# Patient Record
Sex: FEMALE
[De-identification: names, ages, dates, MRNs, and addresses within clinical notes are randomized per-mention and may not be internally consistent; named-entity substitution may affect disease eponyms.]

---

## 2018-08-30 ENCOUNTER — HOSPITAL ENCOUNTER (EMERGENCY)
Dept: HOSPITAL 97 - ER | Age: 28
Discharge: HOME | End: 2018-08-30
Payer: SELF-PAY

## 2018-08-30 VITALS — DIASTOLIC BLOOD PRESSURE: 58 MMHG | OXYGEN SATURATION: 97 % | SYSTOLIC BLOOD PRESSURE: 93 MMHG

## 2018-08-30 VITALS — TEMPERATURE: 99 F

## 2018-08-30 DIAGNOSIS — N20.0: Primary | ICD-10-CM

## 2018-08-30 DIAGNOSIS — Z87.442: ICD-10-CM

## 2018-08-30 LAB
BLD SMEAR INTERP: (no result)
BUN BLD-MCNC: 8 MG/DL (ref 7–18)
GLUCOSE SERPLBLD-MCNC: 125 MG/DL (ref 74–106)
HCT VFR BLD CALC: 42.7 % (ref 36–45)
LYMPHOCYTES # SPEC AUTO: 0.8 K/UL (ref 0.7–4.9)
MCH RBC QN AUTO: 29.4 PG (ref 27–35)
MCV RBC: 87.4 FL (ref 80–100)
MORPHOLOGY BLD-IMP: (no result)
PMV BLD: 8 FL (ref 7.6–11.3)
POTASSIUM SERPL-SCNC: 3.4 MMOL/L (ref 3.5–5.1)
RBC # BLD: 4.89 M/UL (ref 3.86–4.86)

## 2018-08-30 PROCEDURE — 81025 URINE PREGNANCY TEST: CPT

## 2018-08-30 PROCEDURE — 81003 URINALYSIS AUTO W/O SCOPE: CPT

## 2018-08-30 PROCEDURE — 85025 COMPLETE CBC W/AUTO DIFF WBC: CPT

## 2018-08-30 PROCEDURE — 99284 EMERGENCY DEPT VISIT MOD MDM: CPT

## 2018-08-30 PROCEDURE — 36415 COLL VENOUS BLD VENIPUNCTURE: CPT

## 2018-08-30 PROCEDURE — 96361 HYDRATE IV INFUSION ADD-ON: CPT

## 2018-08-30 PROCEDURE — 76377 3D RENDER W/INTRP POSTPROCES: CPT

## 2018-08-30 PROCEDURE — 96374 THER/PROPH/DIAG INJ IV PUSH: CPT

## 2018-08-30 PROCEDURE — 80048 BASIC METABOLIC PNL TOTAL CA: CPT

## 2018-08-30 PROCEDURE — 74176 CT ABD & PELVIS W/O CONTRAST: CPT

## 2018-08-30 NOTE — RAD REPORT
EXAM DESCRIPTION:  CT - Stone Protocol - 8/30/2018 9:25 am

 

CLINICAL HISTORY:  Abdominal pain/left lower quadrant pain for 3 days

 

COMPARISON:  None

 

TECHNIQUE:  Computed axial tomography of the abdomen pelvis was obtained without oral or IV contrast.
 Lack of IV and oral contrast limits evaluation of solid organs, bowel, and vessels. Coronal reformat
eamon images were obtained and reviewed.

 

All CT scans are performed using dose optimization technique as appropriate and may include automated
 exposure control or mA/KV adjustment according to patient size.

 

FINDINGS:  Multiple, small bilateral renal calculi are present. Hydronephrosis is not seen. A 1 emma
meter calculus is suspected within the mid left ureter.

 

The liver, spleen, pancreas and adrenals appear grossly normal

 

There is no evidence of diverticulitis. The appendix appears normal

 

Small nodular densities are within posterior for of within the ischial rectal fossa which likely is b
enign.

 

IMPRESSION:  1 millimeter nonobstructing left ureteral calculus.

 

Small bilateral renal calculi

## 2018-08-30 NOTE — ER
Nurse's Notes                                                                                     

 [facility]                                                                

Name: Krystal Lozano                                                                             

Age: 28 yrs                                                                                       

Sex: Female                                                                                       

: 1990                                                                                   

MRN: [id number]                                                                                   

Arrival Date: 2018                                                                          

Time: 08:39                                                                                       

Account#: [id number]                                                                            

Bed 5                                                                                             

Private MD: Sp Tovar M                                                                       

Diagnosis: Calculus of kidney                                                                     

                                                                                                  

Presentation:                                                                                     

                                                                                             

08:49 Presenting complaint: Patient states: LLQ pain x 3 days that resolved yesterday, left   hb  

      flank pain today. TMAX 102. Transition of care: patient was not received from another       

      setting of care. Onset of symptoms was 2018. Risk Assessment: Do you want to     

      hurt yourself or someone else? Patient reports no desire to harm self or others. Care       

      prior to arrival: None.                                                                     

08:49 Method Of Arrival: Ambulatory                                                           hb  

08:49 Acuity: OMAR 3                                                                           hb  

09:00 Initial Sepsis Screen: Does the patient meet any 2 criteria? No. Patient's initial      hb  

      sepsis screen is negative. Does the patient have a suspected source of infection? No.       

      Patient's initial sepsis screen is negative.                                                

11:15 Initial Sepsis Screen:.                                                                 cc3 

                                                                                                  

Historical:                                                                                       

- Allergies:                                                                                      

08:52 No Known Allergies;                                                                     hb  

- Home Meds:                                                                                      

08:52 fluoxetine 20 mg Oral cap [Active];                                                     hb  

- PMHx:                                                                                           

08:52 None;                                                                                   hb  

- PSHx:                                                                                           

08:52 None;                                                                                   hb  

                                                                                                  

- Immunization history:: Adult Immunizations up to date.                                          

- Social history:: Smoking status: Patient/guardian denies using tobacco.                         

- Ebola Screening: : No symptoms or risks identified at this time.                                

                                                                                                  

                                                                                                  

Screenin:52 Abuse screen: Denies threats or abuse. Denies injuries from another. Nutritional        hb  

      screening: No deficits noted. Tuberculosis screening: No symptoms or risk factors           

      identified. Fall Risk None identified.                                                      

                                                                                                  

Assessment:                                                                                       

08:52 General: Appears in no apparent distress. Behavior is cooperative, anxious, crying.     hb  

      Pain: Pain currently is 4 out of 10 on a pain scale. Neuro: Level of Consciousness is       

      awake, alert, obeys commands, Oriented to person, place, time, situation.                   

      Cardiovascular: Capillary refill < 3 seconds Patient's skin is warm and dry.                

      Respiratory: Airway is patent Trachea midline Respiratory effort is even, unlabored,        

      Respiratory pattern is regular, symmetrical, Breath sounds are clear bilaterally. GI:       

      Abdomen is flat, Bowel sounds present X 4 quads. Abd is soft and non tender X 4 quads.      

      : Reports urinary frequency. EENT: No signs and/or symptoms were reported regarding       

      the EENT system. Derm: Skin is intact, is healthy with good turgor. Musculoskeletal: No     

      signs and/or symptoms reported regarding the musculoskeletal system.                        

09:45 Reassessment: Patient appears in no apparent distress at this time. Patient and/or      hb  

      family updated on plan of care and expected duration. Pain level reassessed. Patient is     

      alert, oriented x 3, equal unlabored respirations, skin warm/dry/pink.                      

10:45 Reassessment: Patient appears in no apparent distress at this time. Patient and/or      hb  

      family updated on plan of care and expected duration. Pain level reassessed. Patient is     

      alert, oriented x 3, equal unlabored respirations, skin warm/dry/pink.                      

                                                                                                  

Vital Signs:                                                                                      

08:49  / 90; Pulse 102; Resp 16; Temp 99; Pulse Ox 100% on R/A; Pain 4/10;              hb  

10:31 BP 93 / 58; Pulse 81; Resp 16; Pulse Ox 97% ;                                           sv  

                                                                                                  

ED Course:                                                                                        

08:39 Patient arrived in ED.                                                                  sb2 

08:40 Sp Tovar MD is Private Physician.                                                  sb2 

08:42 [facility]                                                        kb  

08:42 Blake Squires MD is Attending Physician.                                                kb  

08:50 Triage completed.                                                                       hb  

08:52 Arm band placed on right wrist.                                                         hb  

08:52 Patient has correct armband on for positive identification. Placed in gown. Bed in low  hb  

      position. Call light in reach. Side rails up X 1.                                           

09:09 Rosalva Ortega, RN is Primary Nurse.                                                   hb  

09:20 CT completed. Patient tolerated procedure well. Patient moved to CT via wheelchair.     [id number] 

09:25 CT Stone Protocol In Process Unspecified.                                               EDMS

11:10 No provider procedures requiring assistance completed. IV discontinued, intact,         cc3 

      bleeding controlled, No redness/swelling at site. Pressure dressing applied.                

                                                                                                  

Administered Medications:                                                                         

09:00 Drug: NS 0.9% 1000 ml Route: IV; Rate: 1000 ml; Site: right antecubital;                hb  

11:15 Follow up: Response: No adverse reaction; IV Status: Completed infusion                 hb  

09:03 Drug: TORadol 30 mg Route: IVP; Site: right antecubital;                                sv  

09:45 Follow up: Response: No adverse reaction; Pain is decreased                             hb  

11:15 Drug: Potassium Chloride 20 mEq Route: PO;                                              hb  

11:15 Follow up: Response: Medication administered at discharge.                              hb  

11:15 Drug: Norco 5 mg-325 mg 1 tabs Route: PO;                                               hb  

11:15 Follow up: Response: Medication administered at discharge.                              hb  

                                                                                                  

                                                                                                  

Outcome:                                                                                          

10:55 Discharge ordered by MD.                                                                kb  

11:10 Discharged to home ambulatory.                                                          cc3 

11:10 Condition: stable                                                                           

11:10 Discharge instructions given to patient, Instructed on discharge instructions, follow       

      up and referral plans. medication usage, Demonstrated understanding of instructions,        

      follow-up care, medications, Prescriptions given X 3.                                       

11:16 Patient left the ED.                                                                    hb  

                                                                                                  

Signatures:                                                                                       

Dispatcher [employer]                           EDMS                                                 

Awa Arriaza, SANIA MARTINEZ-Irma Gonzalez, RN                    RN   Rosalva Gimenez RN                     RN                                                      

Daniela Robertson2                                                  

Lakshmi Can3                                                  

Velma Cain                              [email or web address]                                                  

                                                                                                  

**************************************************************************************************

## 2018-08-30 NOTE — EDPHYS
Physician Documentation                                                                           

 [facility]                                                                

Name: Krystal Lozano                                                                             

Age: 28 yrs                                                                                       

Sex: Female                                                                                       

: 1990                                                                                   

MRN: [id number]                                                                                   

Arrival Date: 2018                                                                          

Time: 08:39                                                                                       

Account#: [id number]                                                                            

Bed 5                                                                                             

Private MD: Sp Tovar M                                                                       

ED Physician Blake Squires                                                                         

HPI:                                                                                              

                                                                                             

08:55 This 28 yrs old  Female presents to ER via Ambulatory with complaints of        kb  

      Possible Kidney Stone.                                                                      

08:55 The patient complains of pain in the left flank. The pain does not radiate. Onset: The  kb  

      symptoms/episode began/occurred 2 day(s) ago. Modifying factors: The symptoms are           

      alleviated by nothing. the symptoms are aggravated by palpation/percussion. Associated      

      signs and symptoms: Pertinent positives: nausea, vomiting. Severity of pain: At its         

      worst the pain was moderate in the emergency department the pain is unchanged. The          

      patient has experienced a previous episode, but today's symptoms are worse. The patient     

      has been recently seen by a physician:. Pt states abd cramping started 2 days ago and       

      now the pain is in left flank. States she has had a kidney stone in the past and it         

      feels the same. Reports she has been vomiting once every morning for a month. Went to       

      pcp 2 days ago and was told to go to GYN. Saw Dr Espitia and was told to come to the ER       

      for continued symptoms.                                                                     

                                                                                                  

Historical:                                                                                       

- Allergies:                                                                                      

08:52 No Known Allergies;                                                                     hb  

- Home Meds:                                                                                      

08:52 fluoxetine 20 mg Oral cap [Active];                                                     hb  

- PMHx:                                                                                           

08:52 None;                                                                                   hb  

- PSHx:                                                                                           

08:52 None;                                                                                   hb  

                                                                                                  

- Immunization history:: Adult Immunizations up to date.                                          

- Social history:: Smoking status: Patient/guardian denies using tobacco.                         

- Ebola Screening: : No symptoms or risks identified at this time.                                

                                                                                                  

                                                                                                  

ROS:                                                                                              

08:55 Constitutional: Negative for fever, chills, and weight loss, Cardiovascular: Negative   kb  

      for chest pain, palpitations, and edema, Respiratory: Negative for shortness of breath,     

      cough, wheezing, and pleuritic chest pain, MS/Extremity: Negative for injury and            

      deformity, Skin: Negative for injury, rash, and discoloration, Neuro: Negative for          

      headache, weakness, numbness, tingling, and seizure.                                        

08:55 Abdomen/GI: Positive for nausea and vomiting, Negative for abdominal pain, diarrhea,        

      constipation, abdominal cramps, abdominal distension, anorexia.                             

08:55 Back: Positive for flank pain, on the left.                                                 

                                                                                                  

Exam:                                                                                             

08:55 Constitutional:  This is a well developed, well nourished patient who is awake, alert,  kb  

      and in no acute distress. Head/Face:  Normocephalic, atraumatic. Chest/axilla:  Normal      

      chest wall appearance and motion.  Nontender with no deformity.  No lesions are             

      appreciated. Cardiovascular:  Regular rate and rhythm with a normal S1 and S2.  No          

      gallops, murmurs, or rubs.  Normal PMI, no JVD.  No pulse deficits. Respiratory:  Lungs     

      have equal breath sounds bilaterally, clear to auscultation and percussion.  No rales,      

      rhonchi or wheezes noted.  No increased work of breathing, no retractions or nasal          

      flaring. Abdomen/GI:  Soft, non-tender, with normal bowel sounds.  No distension or         

      tympany.  No guarding or rebound.  No evidence of tenderness throughout. Skin:  Warm,       

      dry with normal turgor.  Normal color with no rashes, no lesions, and no evidence of        

      cellulitis. MS/ Extremity:  Pulses equal, no cyanosis.  Neurovascular intact.  Full,        

      normal range of motion. Neuro:  Awake and alert, GCS 15, oriented to person, place,         

      time, and situation.  Cranial nerves II-XII grossly intact.  Motor strength 5/5 in all      

      extremities.  Sensory grossly intact.  Cerebellar exam normal.  Normal gait.                

08:55 Back: pain, that is moderate, of the  left flank, CVA tenderness, that is moderate, is      

      noted on the left.                                                                          

                                                                                                  

Vital Signs:                                                                                      

08:49  / 90; Pulse 102; Resp 16; Temp 99; Pulse Ox 100% on R/A; Pain 4/10;              hb  

10:31 BP 93 / 58; Pulse 81; Resp 16; Pulse Ox 97% ;                                           sv  

                                                                                                  

MDM:                                                                                              

08:42 Patient medically screened.                                                             kb  

09:00 Data reviewed: vital signs, nurses notes. Data interpreted: Pulse oximetry: on room air kb  

      is 100 %. Interpretation: normal.                                                           

10:50 Counseling: I had a detailed discussion with the patient and/or guardian regarding: the kb  

      historical points, exam findings, and any diagnostic results supporting the                 

      discharge/admit diagnosis, lab results, radiology results, the need for outpatient          

      follow up, a family practitioner, to return to the emergency department if symptoms         

      worsen or persist or if there are any questions or concerns that arise at home.             

                                                                                                  

                                                                                             

08:50 Order name: Basic Metabolic Panel; Complete Time: 09:35                                 kb  

                                                                                             

08:50 Order name: CBC with Diff; Complete Time: 10:57                                         kb  

                                                                                             

08:55 Order name: CT Stone Protocol; Complete Time: 10:50                                     kb  

                                                                                             

09:01 Order name: Urine Dipstick--Ancillary (enter results); Complete Time: 09:40             eb  

                                                                                             

09:01 Order name: Urine Pregnancy--Ancillary (enter results); Complete Time: 09:40            eb  

                                                                                             

09:21 Order name: CBC Smear Scan; Complete Time: 10:57                                        EDMS

                                                                                             

08:50 Order name: Urine Pregnancy Test (obtain specimen); Complete Time: 08:54                kb  

                                                                                             

08:50 Order name: IV Saline Lock; Complete Time: 09:10                                        kb  

                                                                                             

08:50 Order name: Labs collected and sent; Complete Time: 09:10                               kb  

                                                                                             

08:50 Order name: Urine Dipstick-Ancillary (obtain specimen); Complete Time: 08:54            kb  

                                                                                                  

Administered Medications:                                                                         

09:00 Drug: NS 0.9% 1000 ml Route: IV; Rate: 1000 ml; Site: right antecubital;                hb  

11:15 Follow up: Response: No adverse reaction; IV Status: Completed infusion                 hb  

09:03 Drug: TORadol 30 mg Route: IVP; Site: right antecubital;                                sv  

09:45 Follow up: Response: No adverse reaction; Pain is decreased                             hb  

11:15 Drug: Potassium Chloride 20 mEq Route: PO;                                              hb  

11:15 Follow up: Response: Medication administered at discharge.                              hb  

11:15 Drug: Norco 5 mg-325 mg 1 tabs Route: PO;                                               hb  

11:15 Follow up: Response: Medication administered at discharge.                              hb  

                                                                                                  

                                                                                                  

Disposition:                                                                                      

16:47 Co-signature as Attending Physician, Blake Squires MD.                                    rn  

                                                                                                  

Disposition:                                                                                      

18 10:55 Discharged to Home. Impression: Calculus of kidney.                                

- Condition is Stable.                                                                            

- Discharge Instructions: Kidney Stones, Easy-to-Read.                                            

- Prescriptions for Zofran 4 mg Oral Tablet - take 1 tablet by ORAL route every 6 hours           

  As needed; 20 tablet. Diclofenac Sodium 75 mg Oral Tablet, Delayed Release (E.C.) -             

  take 1 tablet by ORAL route 2 times per day As needed; 30 tablet. Macrobid 100 mg               

  Oral Capsule - take 1 capsule by ORAL route every 12 hours for 7 days; 14 capsule.              

- Medication Reconciliation Form, Thank You Letter, Antibiotic Education, Prescription            

  Opioid Use form.                                                                                

- Follow up: Emergency Department; When: As needed; Reason: Worsening of condition.               

  Follow up: Private Physician; When: 2 - 3 days; Reason: Recheck today's complaints,             

  Continuance of care, Re-evaluation by your physician.                                           

                                                                                                  

                                                                                                  

                                                                                                  

Signatures:                                                                                       

Dispatcher [employer]                           EDMS                                                 

Awa Arriaza, FNP-C                 FNP-Irma Gonzalez, RN                    RN   Blake Moe MD MD rn Baxter, Heather, RN                     RN   hb                                                   

                                                                                                  

Corrections: (The following items were deleted from the chart)                                    

11:16 10:55 2018 10:55 Discharged to Home. Impression: Calculus of kidney. Condition is hb  

      Stable. Forms are Medication Reconciliation Form, Thank You Letter, Antibiotic              

      Education, Prescription Opioid Use. Follow up: Emergency Department; When: As needed;       

      Reason: Worsening of condition. Follow up: Private Physician; When: 2 - 3 days; Reason:     

      Recheck today's complaints, Continuance of care, Re-evaluation by your physician. kb        

                                                                                                  

**************************************************************************************************

## 2021-06-14 ENCOUNTER — HOSPITAL ENCOUNTER (EMERGENCY)
Dept: HOSPITAL 97 - ER | Age: 31
Discharge: HOME | End: 2021-06-14
Payer: SELF-PAY

## 2021-06-14 VITALS — OXYGEN SATURATION: 100 % | TEMPERATURE: 97.7 F

## 2021-06-14 VITALS — DIASTOLIC BLOOD PRESSURE: 89 MMHG | SYSTOLIC BLOOD PRESSURE: 123 MMHG

## 2021-06-14 DIAGNOSIS — N13.2: Primary | ICD-10-CM

## 2021-06-14 DIAGNOSIS — Z87.442: ICD-10-CM

## 2021-06-14 LAB
ALBUMIN SERPL BCP-MCNC: 4.1 G/DL (ref 3.4–5)
ALP SERPL-CCNC: 69 U/L (ref 45–117)
ALT SERPL W P-5'-P-CCNC: 37 U/L (ref 12–78)
AST SERPL W P-5'-P-CCNC: 25 U/L (ref 15–37)
BUN BLD-MCNC: 16 MG/DL (ref 7–18)
GLUCOSE SERPLBLD-MCNC: 129 MG/DL (ref 74–106)
HCT VFR BLD CALC: 41.4 % (ref 36–45)
LIPASE SERPL-CCNC: 116 U/L (ref 73–393)
LYMPHOCYTES # SPEC AUTO: 3.9 K/UL (ref 0.7–4.9)
PMV BLD: 8.2 FL (ref 7.6–11.3)
POTASSIUM SERPL-SCNC: 3.9 MMOL/L (ref 3.5–5.1)
RBC # BLD: 4.64 M/UL (ref 3.86–4.86)
SP GR UR: 1.02 (ref 1–1.03)

## 2021-06-14 PROCEDURE — 99284 EMERGENCY DEPT VISIT MOD MDM: CPT

## 2021-06-14 PROCEDURE — 80048 BASIC METABOLIC PNL TOTAL CA: CPT

## 2021-06-14 PROCEDURE — 96375 TX/PRO/DX INJ NEW DRUG ADDON: CPT

## 2021-06-14 PROCEDURE — 81025 URINE PREGNANCY TEST: CPT

## 2021-06-14 PROCEDURE — 76377 3D RENDER W/INTRP POSTPROCES: CPT

## 2021-06-14 PROCEDURE — 83690 ASSAY OF LIPASE: CPT

## 2021-06-14 PROCEDURE — 80076 HEPATIC FUNCTION PANEL: CPT

## 2021-06-14 PROCEDURE — 36415 COLL VENOUS BLD VENIPUNCTURE: CPT

## 2021-06-14 PROCEDURE — 96374 THER/PROPH/DIAG INJ IV PUSH: CPT

## 2021-06-14 PROCEDURE — 85025 COMPLETE CBC W/AUTO DIFF WBC: CPT

## 2021-06-14 PROCEDURE — 74176 CT ABD & PELVIS W/O CONTRAST: CPT

## 2021-06-14 PROCEDURE — 81003 URINALYSIS AUTO W/O SCOPE: CPT

## 2021-06-14 PROCEDURE — 87086 URINE CULTURE/COLONY COUNT: CPT

## 2021-06-14 PROCEDURE — 87088 URINE BACTERIA CULTURE: CPT

## 2021-06-14 NOTE — ER
Nurse's Notes                                                                                     

 [facility]                                                                 

Name: Krystal Lozano                                                                             

Age: 31 yrs                                                                                       

Sex: Female                                                                                       

: 1990                                                                                   

MRN: [id number]                                                                                   

Arrival Date: 2021                                                                          

Time: 05:36                                                                                       

Account#: [id number]                                                                            

Bed 20                                                                                            

Private MD:                                                                                       

Diagnosis: Hydronephrosis with renal and ureteral calculous obstruction                           

                                                                                                  

Presentation:                                                                                     

                                                                                             

05:45 Chief complaint: Patient states: she is having severe back pain which radiates to her   bb  

      abdomen. Coronavirus screen: At this time, the client does not indicate any symptoms        

      associated with coronavirus-19. Ebola Screen: No symptoms or risks identified at this       

      time.                                                                                       

05:45 Method Of Arrival: Ambulatory                                                           bb  

05:50 Initial Sepsis Screen: Does the patient meet any 2 criteria? No. Patient's initial      bb  

      sepsis screen is negative. Does the patient have a suspected source of infection? No.       

      Patient's initial sepsis screen is negative. Risk Assessment: Do you want to hurt           

      yourself or someone else? Patient reports no desire to harm self or others. Onset of        

      symptoms was 2021.                                                                 

05:50 Acuity: OMAR 3                                                                           bb  

                                                                                                  

OB/GYN:                                                                                           

05:59 LMP N/A - Birth control method                                                          bb  

                                                                                                  

Historical:                                                                                       

- Allergies:                                                                                      

05:59 No Known Allergies;                                                                     bb  

- Home Meds:                                                                                      

05:59 None [Active];                                                                          bb  

- PMHx:                                                                                           

05:59 Kidney stones;                                                                          bb  

- PSHx:                                                                                           

05:59 cerclage; nephrostomy tube;                                                             bb  

                                                                                                  

- Immunization history:: Adult Immunizations unknown.                                             

- Social history:: Smoking status: unknown.                                                       

                                                                                                  

                                                                                                  

Screenin:06 Abuse screen: Denies threats or abuse. Nutritional screening: No deficits noted.        fu  

      Tuberculosis screening: No symptoms or risk factors identified. Fall Risk Ambulatory        

      Aid- None/Bed Rest/Nurse Assist (0 pts). Gait- Normal/Bed Rest/Wheelchair (0 pts)           

      Mental Status- Oriented to own ability (0 pts). Total Baer Fall Scale indicates No         

      Risk (0-24 pts).                                                                            

                                                                                                  

Assessment:                                                                                       

05:59 General: Appears uncomfortable, Behavior is cooperative, anxious. Pain: Complains of    fu  

      pain in backpain Pain radiates to abdomen Pain currently is 10 out of 10 on a pain          

      scale. Pain began 1 day ago. Neuro: Level of Consciousness is awake, alert, obeys           

      commands, Oriented to person, place, time, situation,  are equal bilaterally Moves     

      all extremities. Gait is unsteady, Speech is normal, Facial symmetry appears normal.        

      Cardiovascular: Denies chest pain.                                                          

07:05 Reassessment: Patient appears in no apparent distress at this time. Patient and/or      fu  

      family updated on plan of care and expected duration. Pain level reassessed. Patient is     

      alert, oriented x 3, equal unlabored respirations, skin warm/dry/pink. Patient states       

      feeling better. Patient states symptoms have improved. Pain: Complains of pain in back      

      Pain radiates to abdomen. Neuro: Level of Consciousness is awake, alert, obeys              

      commands, Oriented to person, place, time, situation. Cardiovascular: Denies chest          

      pain, Capillary refill Patient's skin is warm and dry. Respiratory: Airway is patent        

      Respiratory effort is even, unlabored, Respiratory pattern is regular, symmetrical. GI:     

      Abdomen is non-distended, Abd is soft and non tender X 4 quads.                             

08:07 Reassessment: Patient appears in no apparent distress at this time. Patient and/or      jd3 

      family updated on plan of care and expected duration. Pain level reassessed. Patient is     

      alert, oriented x 3, equal unlabored respirations, skin warm/dry/pink. Patient states       

      feeling better.                                                                             

09:09 Reassessment: Patient appears in no apparent distress at this time. Patient and/or      jd3 

      family updated on plan of care and expected duration. Pain level reassessed. Patient is     

      alert, oriented x 3, equal unlabored respirations, skin warm/dry/pink. pt reporting         

      pain returned. medicated for pain and discharge pending IV fluid infusion.                  

                                                                                                  

Vital Signs:                                                                                      

05:50  / 82; Pulse 66; Resp 20 S; Temp 97.7(O); Pulse Ox 100% on R/A; Weight 54.43 kg   bb  

      (R); Height 5 ft. 2 in. (157.48 cm) (R); Pain 10/10;                                        

07:04  / 84; Pulse 53; Resp 19 S; Pulse Ox 100% on R/A;                                 fu  

08:08  / 93; Pulse 57; Resp 16 S; Pulse Ox 100% on R/A;                                 jd3 

09:09  / 89; Pulse 62; Resp 17 S; Pulse Ox 100% on R/A;                                 jd3 

05:50 Body Mass Index 21.95 (54.43 kg, 157.48 cm)                                             bb  

                                                                                                  

ED Course:                                                                                        

05:36 Patient arrived in ED.                                                                  es  

05:42 Bubba Deng, RN is Primary Nurse.                                                    fu  

05:42 Abiel Jones MD is Attending Physician.                                             shai 

05:50 Arm band placed on Patient placed in an exam room, on a stretcher, on pulse oximetry.   bb  

      Family accompanied patient.                                                                 

05:57 Triage completed.                                                                       bb  

06:30 Hepatic Function Sent.                                                                  fu  

06:30 CBC with Diff Sent.                                                                     fu  

06:30 Basic Metabolic Panel Sent.                                                             fu  

07:06 Patient has correct armband on for positive identification. Bed in low position. Call   fu  

      light in reach. Side rails up X 1. Adult w/ patient. Pulse ox on. NIBP on.                  

08:07 Radiology exam delayed due to pregnancy test not completed at this time.                  

08:27 CT Stone Protocol In Process Unspecified.                                               EDMS

09:04 Brayden Massey MD is Referral Physician.                                              shai 

09:48 No provider procedures requiring assistance completed. IV discontinued, intact,         iw  

      bleeding controlled, No redness/swelling at site. Pressure dressing applied.                

                                                                                                  

Administered Medications:                                                                         

06:09 Drug: morphine 4 mg Route: IVP; Site: right antecubital;                                fu  

06:40 Follow up: Response: Pain is decreased                                                  fu  

06:09 Drug: Zofran (Ondansetron) 4 mg Route: IVP; Site: right antecubital;                    fu  

06:40 Follow up: Response: No adverse reaction                                                fu  

06:15 Drug: NS 0.9% 1000 ml Route: IV; Rate: 1 bolus; Site: right antecubital;                fu  

06:16 Drug: TORadol (ketorolac) 30 mg Route: IVP; Site: right antecubital;                    fu  

06:40 Follow up: Response: Pain is decreased                                                  fu  

08:56 Drug: NS 0.9% 1000 ml Route: IV; Rate: 1 bolus; Site: right antecubital;                jd3 

08:56 Drug: Rocephin (cefTRIAXone) 1 grams Route: IV; Rate: per protocol; Site: right         jd3 

      antecubital;                                                                                

08:56 Drug: Flomax (tamsulosin) 0.4 mg Route: PO;                                             jd3 

09:07 Drug: Dilaudid (HYDROmorphone) 1 mg Route: IVP; Site: right antecubital;                jd3 

09:07 Drug: Zofran (Ondansetron) 4 mg Route: IVP; Site: right antecubital;                    jd3 

                                                                                                  

                                                                                                  

Outcome:                                                                                          

09:04 Discharge ordered by MD.                                                                shai 

09:48 Discharged to home ambulatory, with family.                                             iw  

09:48 Condition: good                                                                             

09:48 Discharge instructions given to patient, Instructed on discharge instructions, follow       

      up and referral plans. medication usage, Demonstrated understanding of instructions,        

      follow-up care, medications, Prescriptions given X 4.                                       

09:49 Patient left the ED.                                                                    iw  

                                                                                                  

Signatures:                                                                                       

Dispatcher [employer]                           EDMS                                                 

Abiel Jones MD MD cha Salyer, Leanna Garner Brenda, RN                     RN   bb                                                   

Kamini Estrada RN RN                                                      

Yoan Suarez RN RN jd3 Umadhay, Felix, RN                      RN   fu                                                   

                                                                                                  

Corrections: (The following items were deleted from the chart)                                    

07:05 07:04  / 84; Pulse 53bpm; Resp 19bpm; Spontaneous; Pulse Ox 100% RA; fu           fu  

07:14 07:05 Reassessment: Patient appears in no apparent distress at this time. Patient       jd3 

      and/or family updated on plan of care and expected duration. Pain level reassessed.         

      Patient is alert, oriented x 3, equal unlabored respirations, skin warm/dry/pink.           

      Patient states feeling better. fu                                                           

07:15 07:05 Pain: Complains of pain in back Pain radiates to abdomen fu                       jd3 

08:07 07:05 Reassessment: Patient appears in no apparent distress at this time. Patient       jd3 

      and/or family updated on plan of care and expected duration. Pain level reassessed.         

      Patient is alert, oriented x 3, equal unlabored respirations, skin warm/dry/pink.           

      Patient states feeling better. jd3                                                          

                                                                                                  

**************************************************************************************************

## 2021-06-14 NOTE — RAD REPORT
EXAM DESCRIPTION:  CT - Stone Protocol - 6/14/2021 8:27 am

 

CLINICAL HISTORY:  Flank pain.

Abd pain;Flank pain

 

COMPARISON:  Stone Protocol dated 8/30/2018

 

TECHNIQUE:  Axial images were obtained without oral or IV contrast. Lack of contrast limits solid org
an and vascular assessment. The field-of-view spans the entirety of the  system partially obscuring
 uppermost abdomen and lung bases. Coronal reformatted images were obtained and reviewed.

 

All CT scans are performed using dose optimization technique as appropriate and may include automated
 exposure control or mA/KV adjustment according to patient size.

 

FINDINGS:  The lower lung fields are clear.

 

Imaged portions of the liver and spleen show no suspicious findings on non-contrast imaging. The panc
reas and adrenal glands are normal. No pathologic lymphadenopathy in the abdomen or pelvis.

 

3 mm calculus suspected distal right ureter with moderate right hydronephrosis and hydroureter presen
t. Additional small caliceal stones are present bilaterally.

 

No bowel obstruction, free air, free fluid or abscess. Normal appendix noted.

 

No significant bony abnormality. Extensive subcutaneous nodules have developed in both gluteal region
 as well as inferior pelvic fat extending to the perineal region. This is of unclear etiology.

 

IMPRESSION:  3 mm calculus distal right ureter with moderate right hydronephrosis and hydroureter.

 

Development of extensive small soft tissue nodules since 2018 study as detailed. This finding is of u
nclear etiology and clinical correlation is needed.

## 2021-06-14 NOTE — EDPHYS
Physician Documentation                                                                           

 [facility]                                                                 

Name: Krystal Lozano                                                                             

Age: 31 yrs                                                                                       

Sex: Female                                                                                       

: 1990                                                                                   

MRN: [id number]                                                                                   

Arrival Date: 2021                                                                          

Time: 05:36                                                                                       

Account#: [id number]                                                                            

Bed 20                                                                                            

Private MD:                                                                                       

ED Physician Abiel Jones                                                                      

HPI:                                                                                              

                                                                                             

05:59 This 31 yrs old  Female presents to ER via Ambulatory with complaints of Hands  shai 

      tingling,back pain, abd pain, unable to move hands.                                         

05:59 The patient complains of pain in the left low back and left mid back. The patient       shai 

      complains of pain in the right mid back and right low back. The pain does not radiate.      

      Onset: The symptoms/episode began/occurred just prior to arrival. Modifying factors:        

      The symptoms are alleviated by nothing. the symptoms are aggravated by nothing.             

      Associated signs and symptoms: The patient has no apparent associated signs or              

      symptoms. Severity of pain: At its worst the pain was moderate severe in the emergency      

      department the pain is unchanged. The patient has not experienced similar symptoms in       

      the past.                                                                                   

                                                                                                  

OB/GYN:                                                                                           

05:59 LMP N/A - Birth control method                                                          bb  

                                                                                                  

Historical:                                                                                       

- Allergies:                                                                                      

05:59 No Known Allergies;                                                                     bb  

- Home Meds:                                                                                      

05:59 None [Active];                                                                          bb  

- PMHx:                                                                                           

05:59 Kidney stones;                                                                          bb  

- PSHx:                                                                                           

05:59 cerclage; nephrostomy tube;                                                             bb  

                                                                                                  

- Immunization history:: Adult Immunizations unknown.                                             

- Social history:: Smoking status: unknown.                                                       

                                                                                                  

                                                                                                  

ROS:                                                                                              

06:00 Constitutional: Negative for fever, chills, and weight loss, Eyes: Negative for injury, shai 

      pain, redness, and discharge, ENT: Negative for injury, pain, and discharge, Neck:          

      Negative for injury, pain, and swelling, Cardiovascular: Negative for chest pain,           

      palpitations, and edema, Respiratory: Negative for shortness of breath, cough,              

      wheezing, and pleuritic chest pain, : Negative for injury, bleeding, discharge, and       

      swelling, MS/Extremity: Negative for injury and deformity, Skin: Negative for injury,       

      rash, and discoloration, Neuro: Negative for headache, weakness, numbness, tingling,        

      and seizure, Psych: Negative for depression, anxiety, suicide ideation, homicidal           

      ideation, and hallucinations, Allergy/Immunology: Negative for hives, rash, and             

      allergies, Endocrine: Negative for neck swelling, polydipsia, polyuria, polyphagia, and     

      marked weight changes.                                                                      

06:00 Abdomen/GI: Positive for abdominal pain, nausea, vomiting, of the posterior aspect of       

      right lateral abdomen, anterior aspect of right lateral abdomen, right upper quadrant       

      and right lower quadrant.                                                                   

                                                                                                  

Exam:                                                                                             

06:00 Constitutional:  This is a well developed, well nourished patient who is awake, alert,  shai 

      and in no acute distress. Head/Face:  Normocephalic, atraumatic. Eyes:  Pupils equal        

      round and reactive to light, extra-ocular motions intact.  Lids and lashes normal.          

      Conjunctiva and sclera are non-icteric and not injected.  Cornea within normal limits.      

      Periorbital areas with no swelling, redness, or edema. ENT:  Nares patent. No nasal         

      discharge, no septal abnormalities noted.  Tympanic membranes are normal and external       

      auditory canals are clear.  Oropharynx with no redness, swelling, or masses, exudates,      

      or evidence of obstruction, uvula midline.  Mucous membranes moist. Neck:  Trachea          

      midline, no thyromegaly or masses palpated, and no cervical lymphadenopathy.  Supple,       

      full range of motion without nuchal rigidity, or vertebral point tenderness.  No            

      Meningismus. Chest/axilla:  Normal chest wall appearance and motion.  Nontender with no     

      deformity.  No lesions are appreciated. Cardiovascular:  Regular rate and rhythm with a     

      normal S1 and S2.  No gallops, murmurs, or rubs.  Normal PMI, no JVD.  No pulse             

      deficits. Respiratory:  Lungs have equal breath sounds bilaterally, clear to                

      auscultation and percussion.  No rales, rhonchi or wheezes noted.  No increased work of     

      breathing, no retractions or nasal flaring. Pelvic Exam:  Normal external genitalia.        

      Speculum exam with closed cervical os, no discharge or bleeding noted.  Bimanual exam       

      with normal adnexa, no adnexal or cervical motion tenderness.  Normal uterus. Skin:         

      Warm, dry with normal turgor.  Normal color with no rashes, no lesions, and no evidence     

      of cellulitis. MS/ Extremity:  Pulses equal, no cyanosis.  Neurovascular intact.  Full,     

      normal range of motion. Neuro:  Awake and alert, GCS 15, oriented to person, place,         

      time, and situation.  Cranial nerves II-XII grossly intact.  Motor strength 5/5 in all      

      extremities.  Sensory grossly intact.  Cerebellar exam normal.  Normal gait.                

06:00 Abdomen/GI: Inspection: abdomen appears normal, Bowel sounds: normal, in all quadrants,     

      Palpation: mild abdominal tenderness, in the posterior aspect of right lateral abdomen,     

      anterior aspect of right lateral abdomen, right upper quadrant and right lower quadrant.    

                                                                                                  

Vital Signs:                                                                                      

05:50  / 82; Pulse 66; Resp 20 S; Temp 97.7(O); Pulse Ox 100% on R/A; Weight 54.43 kg   bb  

      (R); Height 5 ft. 2 in. (157.48 cm) (R); Pain 10/10;                                        

07:04  / 84; Pulse 53; Resp 19 S; Pulse Ox 100% on R/A;                                 fu  

08:08  / 93; Pulse 57; Resp 16 S; Pulse Ox 100% on R/A;                                 jd3 

09:09  / 89; Pulse 62; Resp 17 S; Pulse Ox 100% on R/A;                                 jd3 

05:50 Body Mass Index 21.95 (54.43 kg, 157.48 cm)                                               

                                                                                                  

MDM:                                                                                              

05:42 Patient medically screened.                                                             [facility] 

06:01 Differential diagnosis: nephrolithiasis, pyelonephritis, UTI. Data reviewed: vital      shai 

      signs, nurses notes, lab test result(s), radiologic studies, CT scan. Data interpreted:     

      Cardiac monitor: rate is 66 beats/min, rhythm is regular, Pulse oximetry: is not            

      applicable for this patient encounter. Test interpretation: by ED physician or midlevel     

      provider: plain radiologic studies. Counseling: I had a detailed discussion with the        

      patient and/or guardian regarding: the historical points, exam findings, and any            

      diagnostic results supporting the discharge/admit diagnosis, lab results.                   

                                                                                                  

                                                                                             

05:58 Order name: Basic Metabolic Panel                                                       [facility] 

                                                                                             

05:58 Order name: CBC with Diff                                                               [facility] 

                                                                                             

05:58 Order name: Hepatic Function                                                            [facility] 

                                                                                             

05:58 Order name: Lipase; Complete Time: 08:09                                                [facility] 

                                                                                             

05:58 Order name: Urine Culture                                                               [facility] 

                                                                                             

05:59 Order name: Basic Metabolic Panel; Complete Time: 08:09                                 [facility]

                                                                                             

05:58 Order name: CT Stone Protocol; Complete Time: 09:03                                     [facility] 

                                                                                             

05:59 Order name: CBC with Automated Diff; Complete Time: 08:09                               [facility]

                                                                                             

05:59 Order name: Liver (Hepatic) Function; Complete Time: 08:09                              [facility]

                                                                                             

08:14 Order name: Urine Dipstick-Ancillary; Complete Time: 09:03                              [facility]

                                                                                             

08:23 Order name: Urine Pregnancy--Ancillary (enter results)                                    

                                                                                             

05:58 Order name: IV Saline Lock; Complete Time: 06:30                                        [facility] 

                                                                                             

05:58 Order name: Labs collected and sent; Complete Time: 06:30                               [facility] 

                                                                                             

05:58 Order name: Urine Dipstick-Ancillary (obtain specimen); Complete Time: 08:22            [facility] 

                                                                                             

05:58 Order name: Urine Pregnancy Test (obtain specimen); Complete Time: 08:22                [facility] 

                                                                                                  

Administered Medications:                                                                         

06:09 Drug: morphine 4 mg Route: IVP; Site: right antecubital;                                fu  

06:40 Follow up: Response: Pain is decreased                                                  fu  

06:09 Drug: Zofran (Ondansetron) 4 mg Route: IVP; Site: right antecubital;                    fu  

06:40 Follow up: Response: No adverse reaction                                                fu  

06:15 Drug: NS 0.9% 1000 ml Route: IV; Rate: 1 bolus; Site: right antecubital;                fu  

06:16 Drug: TORadol (ketorolac) 30 mg Route: IVP; Site: right antecubital;                    fu  

06:40 Follow up: Response: Pain is decreased                                                  fu  

08:56 Drug: NS 0.9% 1000 ml Route: IV; Rate: 1 bolus; Site: right antecubital;                jd3 

08:56 Drug: Rocephin (cefTRIAXone) 1 grams Route: IV; Rate: per protocol; Site: right         jd3 

      antecubital;                                                                                

08:56 Drug: Flomax (tamsulosin) 0.4 mg Route: PO;                                             jd3 

09:07 Drug: Dilaudid (HYDROmorphone) 1 mg Route: IVP; Site: right antecubital;                jd3 

09:07 Drug: Zofran (Ondansetron) 4 mg Route: IVP; Site: right antecubital;                    jd3 

                                                                                                  

                                                                                                  

Disposition:                                                                                      

21 09:04 Discharged to Home. Impression: Hydronephrosis with renal and ureteral             

  calculous obstruction.                                                                          

- Condition is Stable.                                                                            

- Discharge Instructions: Kidney Stones, Kidney Stones, Easy-to-Read, Hydronephrosis,             

  Dietary Guidelines to Help Prevent Kidney Stones.                                               

- Prescriptions for Tylenol- Codeine #3 300-30 mg Oral Tablet - take 2 tablets by ORAL            

  route every 4-6 hours As needed; 26 tablet. Zofran 4 mg Oral Tablet - take 1 tablet             

  by ORAL route every 12 hours As needed; 20 tablet. Flomax 0.4 mg Oral Capsule, Sust.            

  Release 24 hr - take 1 capsule by ORAL route once daily 1/2 hour following the same             

  meal each day; 30 capsule. Cipro 500 mg Oral Tablet - take 1 tablet by ORAL route               

  every 12 hours for 7 days; 14 tablet.                                                           

- Medication Reconciliation Form, Thank You Letter, Antibiotic Education, Prescription            

  Opioid Use, Family Work Release form.                                                           

- Follow up: Private Physician; When: 2 - 3 days; Reason: Recheck today's complaints,             

  Continuance of care, Re-evaluation by your physician. Follow up: Brayden Massey;                

  When: 2 - 3 days; Reason: Recheck today's complaints, Re-evaluation by your physician.          

- Problem is new.                                                                                 

- Symptoms have improved.                                                                         

                                                                                                  

                                                                                                  

                                                                                                  

Signatures:                                                                                       

Dispatcher [employer]                           EDAbiel Gilbert MD MD cha Ballard, Brenda, RN                     RN   Kamini Casper RN RN iw Davies, Jonathon, RN RN   jBubba Rose RN                      MARI   fu                                                   

                                                                                                  

Corrections: (The following items were deleted from the chart)                                    

09:49 09:04 2021 09:04 Discharged to Home. Impression: Hydronephrosis with renal and    iw  

      ureteral calculous obstruction. Condition is Stable. Discharge Instructions: Kidney         

      Stones, Kidney Stones, Easy-to-Read, Hydronephrosis, Dietary Guidelines to Help Prevent     

      Kidney Stones. Prescriptions for Tylenol-Codeine #3 300-30 mg Oral Tablet - take 2          

      tablets by ORAL route every 4-6 hours As needed; 26 tablet, Zofran 4 mg Oral Tablet -       

      take 1 tablet by ORAL route every 12 hours As needed; 20 tablet, Flomax 0.4 mg Oral         

      Capsule, Sust. Release 24 hr - take 1 capsule by ORAL route once daily 1/2 hour             

      following the same meal each day; 30 capsule, Cipro 500 mg Oral Tablet - take 1 tablet      

      by ORAL route every 12 hours for 7 days; 14 tablet. and Forms are Medication                

      Reconciliation Form, Thank You Letter, Antibiotic Education, Prescription Opioid Use.       

      Follow up: Private Physician; When: 2 - 3 days; Reason: Recheck today's complaints,         

      Continuance of care, Re-evaluation by your physician. Follow up: Brayden Massey; When:      

      2 - 3 days; Reason: Recheck today's complaints, Re-evaluation by your physician.            

      Problem is new. Symptoms have improved. shai                                                 

                                                                                                  

**************************************************************************************************

## 2022-12-19 ENCOUNTER — HOSPITAL ENCOUNTER (EMERGENCY)
Dept: HOSPITAL 97 - ER | Age: 32
Discharge: HOME | End: 2022-12-19
Payer: SELF-PAY

## 2022-12-19 VITALS — TEMPERATURE: 98.6 F

## 2022-12-19 VITALS — OXYGEN SATURATION: 99 % | SYSTOLIC BLOOD PRESSURE: 124 MMHG | DIASTOLIC BLOOD PRESSURE: 84 MMHG

## 2022-12-19 DIAGNOSIS — N13.2: Primary | ICD-10-CM

## 2022-12-19 DIAGNOSIS — Z87.442: ICD-10-CM

## 2022-12-19 LAB — SP GR UR: 1.02 (ref 1–1.03)

## 2022-12-19 PROCEDURE — 99283 EMERGENCY DEPT VISIT LOW MDM: CPT

## 2022-12-19 PROCEDURE — 81025 URINE PREGNANCY TEST: CPT

## 2022-12-19 PROCEDURE — 76377 3D RENDER W/INTRP POSTPROCES: CPT

## 2022-12-19 PROCEDURE — 96372 THER/PROPH/DIAG INJ SC/IM: CPT

## 2022-12-19 PROCEDURE — 74176 CT ABD & PELVIS W/O CONTRAST: CPT

## 2022-12-19 PROCEDURE — 81003 URINALYSIS AUTO W/O SCOPE: CPT

## 2022-12-19 NOTE — XMS REPORT
Continuity of Care Document

                          Created on:2022



Patient:PAMELLA LOZANO

Sex:Female

:1990

External Reference #:[id number]





Demographics







                          Address                   [address]



                                                    [place] [address]

 

                          Home Phone                [phone]

 

                          Work Phone                [phone]

 

                          Mobile Phone              [phone]

 

                          Email Address             [email or web address]

 

                          Preferred Language        English

 

                          Marital Status            Unknown

 

                          Alevism Affiliation     Unknown

 

                          Race                      Unknown

 

                          Additional Race(s)        Unavailable



                                                    White

 

                          Ethnic Group              Unknown









Author







                          Organization              [facility]

t

 

                          Address                   [address]



                                                    [place] [address]

 

                          Phone                     [phone]









Support







                Name            Relationship    Address         Phone

 

                SAIRA, UNKNOWN OT              [address] [place] [address] [phone]



                                                [place] [address] 

 

                SAIRA, UNKNOWN OT              [address] [place], [place] [phone]



                                                [place] [address] 

 

                SABINE LOZANO SI              [address] [place] [address] [phone]



                                                [place] [address] 

 

                SABINE LOZANO CONOR SI              [phone]



                                                [place] [address] 

 

                ANTHARSH  V               [phone]



                                                [place] [address] 

 

                Sabine Lozano Sibling         unknown         [phone]



                                                [place] [address] 

 

                [employer]  Life Partner    unknown         [phone]



                                                [place] [address] 









Care Team Providers







                    Name                Role                Phone

 

                    Katelyn Lay Primary Care Physician [phone]

 

                    Inés Alas    Attending Clinician Unavailable

 

                    OSMANI_RENITA_Bishop_L Attending Clinician Unavailable

 

                    Mike Attending Clinician Unavailable

 

                    Inés Alas      Attending Clinician [phone]

 

                    KATELYN WILKINSON Attending Clinician Unavailable

 

                    Katelyn Lay Attending Clinician [phone]

 

                    CRIS GUZMAN   Attending Clinician Unavailable

 

                    [facility] Attending Clinician Unavailable

 

                    Cris Guzman MD Attending Clinician [phone]

 

                    1, [facility]  Attending Clinician Unavailable

 

                    Sintia Radford MD  Attending Clinician [phone]

 

                    SINTIA RADFORD     Attending Clinician Unavailable

 

                    ARSH RODRIGUEZ  Attending Clinician Unavailable

 

                    [facility]      Attending Clinician Unavailable

 

                    Doctor Unassigned, No Name Attending Clinician Unavailable

 

                    Elma MARTINEZ, Rochelle JAMES Attending Clinician [phone]

 

                    ROCHELLE WILLS   Attending Clinician Unavailable

 

                    Arsh Jerome Attending Clinician [phone]

 

                    Ludivina Bond    Attending Clinician [phone]

 

                    Jennifer Mendez MD   Attending Clinician [phone]

 

                    [facility] Attending Clinician Unavailable

 

                    Ricco CARO, hCepe HUDSON Attending Clinician [phone]

 

                    Inés Alas    Admitting Clinician Unavailable

 

                    GC_SWHAOMC_Pacheco_L Admitting Clinician Unavailable

 

                    GC_SWHATBIC_Pacheco Admitting Clinician Unavailable









Payers







           Payer Name Policy Type Policy Number Effective Date Expiration Date MIKE castro

 

           Formerly [facility]            [id number]                        



           CHOICE (MEDICAID                                             



           REPLACEMENT - HMO)                                             

 

           Formerly [facility]            [id number]  2022            



           CHOICE MEDICAID                       00:00:00              







Problems







       Condition Condition Condition Status Onset  Resolution Last   Treating Co

mments 

Source



       Name   Details Category        Date   Date   Treatment Clinician        



                                                 Date                 

 

       Cervical Cervical Problem Active                              [facility]

a



       cerclage Cerclage               6-20                               Med[facility]

l



       suture Suture               00:00:                             



       present Present               00                                 

 

       BV     BV     Disease Active                              [facility]



       (bacterial (bacterial               3-30                               it

y of



       vaginosis) vaginosis)               00:00:                             [place]

[place]



                                   [address] [place] [address]

 

       Vaginal Vaginal Disease Active                              [facility]



       discharge discharge               3-29                               ity 

of



                                   00:00:                             [address]

 

       Multiparit Multiparit Disease Active                              U

nivers



       y      y                    3-03                               ity of



                                   00:00:                             [address]

 

       Supervisio Supervisio Disease Active                              U

nivers



       n of   n of                 3-03                               ity of



       high-risk high-risk               00:00:                             [place]

[place]



       pregnancy pregnancy               00                                 [facility]

 

       History of History of Disease Active                       Overview

: Univers



       cervical cervical               3-03                        Formattin ity

 of



       cerclage cerclage               00:00:                      g of this [place]

as



                                   00                          note   Medical



                                                               might be Branch



                                                               different 



                                                               from the 



                                                               original. 



                                                               Reports 



                                                               placement 



                                                               with   



                                                               2011, and 



                                                               2017   



                                                               babies 

 

       History of History of Disease Active                              U

nivers



       miscarriag miscarriag               3-03                               it

y of



       e      e                    00:00:                             [place]



                                   00                                 Medical



                                                                      Branch

 

       History of History of Disease Active                       Overview

: Univers



       nicotine nicotine               3-03                        Formattin ity

 of



       vaping vaping               00:00:                      g of this [place]



                                   00                          note   Medical



                                                               might be Branch



                                                               different 



                                                               from the 



                                                               original. 



                                                               Reports 



                                                               currently 

 

       Abnormal Abnormal Disease Active                              Unive

rs



       glandular glandular               9-04                               ity 

of



       Papanicola Papanicola               00:00:                             Te

[place]



       ou smear ou smear               00                                 Medica

l



       of cervix of cervix                                                  Bran

ch

 

       Cervical Cervical Disease Active                              Unive

rs



       high risk high risk               8-24                               ity 

of



       human  human                00:00:                             [place]



       papillomav papillomav               00                                 Me

dical



       irus (HPV) irus (HPV)                                                  Br

anch



       DNA test DNA test                                                  



       positive positive                                                  

 

       History of History of Problem Active                                    P

rivia



       premature Premature                                                  Medi

amanda



       delivery Delivery                                                  







Allergies, Adverse Reactions, Alerts







       Allergy Allergy Status Severity Reaction(s) Onset  Inactive Treating Comm

ents 

Source



       Name   Type                        Date   Date   Clinician        

 

       No Known DA     Active U             2022                      [facility]



       Allergie                             1-01                        [facility]



       [facility]                                  00[facility]00:                      [facility]



                                          00                          l of



                                                                      [place]

 

       No Known DA     Active U             2022                      [facility]



       Allergie                             0-07                        [facility]



       [facility]                                  00:00:                      [facility]



                                          00                          l of



                                                                      [place]

 

       No Known DA     Active U                                   [facility]



       Allergie                             5-06                        [facility]



       [facility]                                  00:00:                      [facility]



                                          00                          l of



                                                                      [place]

 

       No Known DA     Active U                                   [facility]



       Allergie                             4-12                        [facility]



       [facility]                                  00:00:                      [facility]



                                          00                          l [facility]







Social History







           Social Habit Start Date Stop Date  Quantity   Comments   Source

 

           ASSERTION  2022            Pregnant              [facility]



                      00:00:00                                    [facility]

 

           History of tobacco 2004            Cigarette Smoker            

[facility]



           use        00:00:00                                    [facility]

 

           Exposure to                       Not sure              [facility]



           SARS-CoV-2 (event)                                             [facility]

 

           Tobacco Comment 2022 Vapes daily            Universi

ty of



                      00:00:00   00:00:00                         [facility]

 

           Alcohol intake 2022 Ex-drinker            University

 



                      00:00:00   00:00:00   (finding)             [facility]

 

           Cigarettes smoked 2021                       Univers

ity of



           current (pack per 00:00:00   00:00:00                         [place] [facility]

edical



           day) - Reported                                             Branch

 

           Tobacco use and 2021 Current user            [facility]

ity of



           exposure   00:00:00   00:00:00                         [facility]

 

           Sex Assigned At 1990                       [facility]

y of



           Birth      00:00:00   00:00:00                         [facility]









                Smoking Status  Start Date      Stop Date       Source

 

                Former Smoker                                   [facility]







Medications







       Ordered Filled Start  Stop   Current Ordering Indication Dosage Frequency

 Signature

                    Comments            Components          Source



     Medication Medication Date Date Medication? Clinician                (SIG) 

          



     Name Name                                                   

 

     prenatal            Yes       [id number] 1{tbl}      Take 1           U

nivers



     multivitami      3-03                               tablet by           ithari evans



     n (PRENATAL      00:00:                               mouth           [place]



     VITAMIN)      00                                 daily.           Medical



     tablet                                                        Branch

 

     Prenatal Prenatal       No                       Prenatal           P

rivia



     Vitamin Vitamin 2-23                               Vitamin           Medica

l



               00:00:                                              



               00                                                

 

     Prenatal Prenatal       No                       Prenatal           P

rivia



     Vitamin Vitamin 2-23                               Vitamin           Medica

l



               00:00:                                              



               00                                                

 

     Prenatal Prenatal       No                       Prenatal           P

rivia



     Vitamin Vitamin 2-23                               Vitamin           Medica

l



               00:00:                                              



               00                                                

 

     Prenatal Prenatal 0      No                       Prenatal           P

rivia



     Vitamin Vitamin 2-23                               Vitamin           Medica

l



               00:00:                                              



               00                                                

 

     Prenatal Prenatal 0      No                       Prenatal           P

rivia



     Vitamin Vitamin 2-23                               Vitamin           Medica

l



               00:00:                                              



               00                                                

 

     Prenatal Prenatal 0      No                       Prenatal           P

rivia



     Vitamin Vitamin 2-23                               Vitamin           Medica

l



               00:00:                                              



               00                                                

 

     Prenatal Prenatal 0      No                       Prenatal           P

rivia



     Vitamin Vitamin 2-23                               Vitamin           Medica

l



               00:00:                                              



               00                                                

 

     Prenatal Prenatal 0      No                       Prenatal           P

rivia



     Vitamin Vitamin 2-23                               Vitamin           Medica

l



               00:00:                                              



               00                                                

 

     Prenatal Prenatal 0      No                       Prenatal           P

rivia



     Vitamin Vitamin 2-23                               Vitamin           Medica

l



               00:00:                                              



               00                                                

 

     Prenatal Prenatal 0      No                       Prenatal           P

rivia



     Vitamin Vitamin 2-23                               Vitamin           Medica

l



               00:00:                                              



               00                                                

 

     Prenatal Prenatal 0      No                       Prenatal           P

rivia



     Vitamin Vitamin 2-23                               Vitamin           Medica

l



               00:00:                                              



               00                                                

 

     Prenatal Prenatal 0      No                       Prenatal           P

rivia



     Vitamin Vitamin 2-23                               Vitamin           Medica

l



               00:00:                                              



               00                                                

 

     Prenatal Prenatal       No                       Prenatal           P

rivia



     Vitamin Vitamin 2-23                               Vitamin           Medica

l



               00:00:                                              



               00                                                

 

     Prenatal Prenatal       No                       Prenatal           P

rivia



     Vitamin Vitamin 2-23                               Vitamin           Medica

l



               00:00:                                              



               00                                                

 

     Prenatal Prenatal       No                       Prenatal           P

rivia



     Vitamin Vitamin 2-23                               Vitamin           Medica

l



               00:00:                                              



               00                                                

 

     acetaminoph acetaminoph           No                       acetaminop      

     [facility]



     en 300 en 300                                    hen 300           Medical



     mg-codeine mg-codeine                                    mg-codeine        

   



     30 mg 30 mg                                    30 mg           



     tablet TAKE tablet TAKE                                    tablet          

 



     1 TABLET BY 1 TABLET BY                                    TAKE 1          

 



     MOUTH EVERY MOUTH EVERY                                    TABLET BY       

    



     6 HOURS AS 6 HOURS AS                                    MOUTH           



     NEEDED FOR NEEDED FOR                                    EVERY 6           



     SEVERE PAIN SEVERE PAIN                                    HOURS AS        

   



                                                  NEEDED FOR           



                                                  SEVERE           



                                                  PAIN           

 

     Banophen 25 Banophen 25           No                       Banophen        

   [facility]



     mg capsule mg capsule                                    25 mg           Me

dical



                                                  capsule           

 

     indomethaci indomethaci           No                       indomethac      

     [facility]



     n 25 mg n 25 mg                                    in 25 mg           Medic

al



     capsule capsule                                    capsule           



     TAKE 1 TAKE 1                                    TAKE 1           



     CAPSULE BY CAPSULE BY                                    CAPSULE BY        

   



     MOUTH EVERY MOUTH EVERY                                    MOUTH           



     6 HOURS 6 HOURS                                    EVERY 6           



                                                  HOURS           

 

     mometasone mometasone           No                       mometasone        

   [facility]



     0.1 % 0.1 %                                    0.1 %           Medical



     topical topical                                    topical           



     cream cream                                    cream           

 

     acetaminoph acetaminoph           No                       acetaminop      

     [facility]



     en 300 en 300                                    hen 300           Medical



     mg-codeine mg-codeine                                    mg-codeine        

   



     30 mg 30 mg                                    30 mg           



     tablet TAKE tablet TAKE                                    tablet          

 



     1 TABLET BY 1 TABLET BY                                    TAKE 1          

 



     MOUTH EVERY MOUTH EVERY                                    TABLET BY       

    



     6 HOURS AS 6 HOURS AS                                    MOUTH           



     NEEDED FOR NEEDED FOR                                    EVERY 6           



     SEVERE PAIN SEVERE PAIN                                    HOURS AS        

   



                                                  NEEDED FOR           



                                                  SEVERE           



                                                  PAIN           

 

     Banophen 25 Banophen 25           No                       Banophen        

   [facility]



     mg capsule mg capsule                                    25 mg           Me

dical



                                                  capsule           

 

     indomethaci indomethaci           No                       indomethac      

     [facility]



     n 25 mg n 25 mg                                    in 25 mg           Medic

al



     capsule capsule                                    capsule           



     TAKE 1 TAKE 1                                    TAKE 1           



     CAPSULE BY CAPSULE BY                                    CAPSULE BY        

   



     MOUTH EVERY MOUTH EVERY                                    MOUTH           



     6 HOURS 6 HOURS                                    EVERY 6           



                                                  HOURS           

 

     mometasone mometasone           No                       mometasone        

   [facility]



     0.1 % 0.1 %                                    0.1 %           Medical



     topical topical                                    topical           



     cream cream                                    cream           

 

     acetaminoph acetaminoph           No                       acetaminop      

     [facility]



     en 300 en 300                                    hen 300           [facility]



     mg-codeine mg-codeine                                    mg-codeine        

   



     30 mg 30 mg                                    30 mg           



     tablet TAKE tablet TAKE                                    tablet          

 



     1 TABLET BY 1 TABLET BY                                    TAKE 1          

 



     MOUTH EVERY MOUTH EVERY                                    TABLET BY       

    



     6 HOURS AS 6 HOURS AS                                    MOUTH           



     NEEDED FOR NEEDED FOR                                    EVERY 6           



     SEVERE PAIN SEVERE PAIN                                    HOURS AS        

   



                                                  NEEDED FOR           



                                                  SEVERE           



                                                  PAIN           

 

     Banophen 25 Banophen 25           No                       Banophen        

   [facility]



     mg capsule mg capsule                                    25 mg           Me

dical



                                                  capsule           

 

     indomethaci indomethaci           No                       indomethac      

     [facility]



     n 25 mg n 25 mg                                    in 25 mg           [facility]

al



     capsule capsule                                    capsule           



     TAKE 1 TAKE 1                                    TAKE 1           



     CAPSULE BY CAPSULE BY                                    CAPSULE BY        

   



     MOUTH EVERY MOUTH EVERY                                    MOUTH           



     6 HOURS 6 HOURS                                    EVERY 6           



                                                  HOURS           

 

     mometasone mometasone           No                       mometasone        

   [facility]



     0.1 % 0.1 %                                    0.1 %           [facility]



     topical topical                                    topical           



     cream cream                                    cream           

 

     acetaminoph acetaminoph           No                       acetaminop      

     [facility]



     en 300 en 300                                    hen 300           [facility]



     mg-codeine mg-codeine                                    mg-codeine        

   



     30 mg 30 mg                                    30 mg           



     tablet TAKE tablet TAKE                                    tablet          

 



     1 TABLET BY 1 TABLET BY                                    TAKE 1          

 



     MOUTH EVERY MOUTH EVERY                                    TABLET BY       

    



     6 HOURS AS 6 HOURS AS                                    MOUTH           



     NEEDED FOR NEEDED FOR                                    EVERY 6           



     SEVERE PAIN SEVERE PAIN                                    HOURS AS        

   



                                                  NEEDED FOR           



                                                  SEVERE           



                                                  PAIN           

 

     Banophen 25 Banophen 25           No                       Banophen        

   [facility]



     mg capsule mg capsule                                    25 mg           Me

dical



                                                  capsule           

 

     indomethaci indomethaci           No                       indomethac      

     [facility]



     n 25 mg n 25 mg                                    in 25 mg           [facility]

al



     capsule capsule                                    capsule           



     TAKE 1 TAKE 1                                    TAKE 1           



     CAPSULE BY CAPSULE BY                                    CAPSULE BY        

   



     MOUTH EVERY MOUTH EVERY                                    MOUTH           



     6 HOURS 6 HOURS                                    EVERY 6           



                                                  HOURS           

 

     mometasone mometasone           No                       mometasone        

   [facility]



     0.1 % 0.1 %                                    0.1 %           [facility]



     topical topical                                    topical           



     cream cream                                    cream           

 

     acetaminoph acetaminoph           No                       acetaminop      

     [facility]



     en 300 en 300                                    hen 300           [facility]



     mg-codeine mg-codeine                                    mg-codeine        

   



     30 mg 30 mg                                    30 mg           



     tablet TAKE tablet TAKE                                    tablet          

 



     1 TABLET BY 1 TABLET BY                                    TAKE 1          

 



     MOUTH EVERY MOUTH EVERY                                    TABLET BY       

    



     6 HOURS AS 6 HOURS AS                                    MOUTH           



     NEEDED FOR NEEDED FOR                                    EVERY 6           



     SEVERE PAIN SEVERE PAIN                                    HOURS AS        

   



                                                  NEEDED FOR           



                                                  SEVERE           



                                                  PAIN           

 

     Banophen 25 Banophen 25           No                       Banophen        

   [facility]



     mg capsule mg capsule                                    25 mg           Me

dical



                                                  capsule           

 

     indomethaci indomethaci           No                       indomethac      

     [facility]



     n 25 mg n 25 mg                                    in 25 mg           [facility]

al



     capsule capsule                                    capsule           



     TAKE 1 TAKE 1                                    TAKE 1           



     CAPSULE BY CAPSULE BY                                    CAPSULE BY        

   



     MOUTH EVERY MOUTH EVERY                                    MOUTH           



     6 HOURS 6 HOURS                                    EVERY 6           



                                                  HOURS           

 

     mometasone mometasone           No                       mometasone        

   [facility]



     0.1 % 0.1 %                                    0.1 %           [facility]



     topical topical                                    topical           



     cream cream                                    cream           

 

     Banophen 25 Banophen 25           No                       Banophen        

   [facility]



     mg capsule mg capsule                                    25 mg           Me

dical



                                                  capsule           

 

     mometasone mometasone           No                       mometasone        

   [facility]



     0.1 % 0.1 %                                    0.1 %           [facility]



     topical topical                                    topical           



     cream cream                                    cream           

 

     prednisone prednisone           No                       prednisone        

   [facility]



     10 mg 10 mg                                    10 mg           [facility]



     tablet TAKE tablet TAKE                                    tablet          

 



     1 TABLET BY 1 TABLET BY                                    TAKE 1          

 



     MOUTH TWICE MOUTH TWICE                                    TABLET BY       

    



     DAILY FOR 3 DAILY FOR 3                                    MOUTH           



     DAYS DAYS                                    TWICE           



                                                  DAILY FOR           



                                                  3 DAYS           

 

     acetaminoph acetaminoph           No                       acetaminop      

     [facility]



     en 300 en 300                                    hen 300           [facility]



     mg-codeine mg-codeine                                    mg-codeine        

   



     30 mg 30 mg                                    30 mg           



     tablet TAKE tablet TAKE                                    tablet          

 



     1 TABLET BY 1 TABLET BY                                    TAKE 1          

 



     MOUTH EVERY MOUTH EVERY                                    TABLET BY       

    



     6 HOURS AS 6 HOURS AS                                    MOUTH           



     NEEDED FOR NEEDED FOR                                    EVERY 6           



     SEVERE PAIN SEVERE PAIN                                    HOURS AS        

   



                                                  NEEDED FOR           



                                                  SEVERE           



                                                  PAIN           

 

     Banophen 25 Banophen 25           No                       Banophen        

   [facility]



     mg capsule mg capsule                                    25 mg           Me

dical



                                                  capsule           

 

     indomethaci indomethaci           No                       indomethac      

     [facility]



     n 25 mg n 25 mg                                    in 25 mg           [facility]

al



     capsule capsule                                    capsule           



     TAKE 1 TAKE 1                                    TAKE 1           



     CAPSULE BY CAPSULE BY                                    CAPSULE BY        

   



     MOUTH EVERY MOUTH EVERY                                    MOUTH           



     6 HOURS 6 HOURS                                    EVERY 6           



                                                  HOURS           

 

     mometasone mometasone           No                       mometasone        

   [facility]



     0.1 % 0.1 %                                    0.1 %           [facility]



     topical topical                                    topical           



     cream cream                                    cream           

 

     acetaminoph acetaminoph           No                       acetaminop      

     [facility]



     en 300 en 300                                    hen 300           [facility]



     mg-codeine mg-codeine                                    mg-codeine        

   



     30 mg 30 mg                                    30 mg           



     tablet TAKE tablet TAKE                                    tablet          

 



     1 TABLET BY 1 TABLET BY                                    TAKE 1          

 



     MOUTH EVERY MOUTH EVERY                                    TABLET BY       

    



     6 HOURS AS 6 HOURS AS                                    MOUTH           



     NEEDED FOR NEEDED FOR                                    EVERY 6           



     SEVERE PAIN SEVERE PAIN                                    HOURS AS        

   



                                                  NEEDED FOR           



                                                  SEVERE           



                                                  PAIN           

 

     Banophen 25 Banophen 25           No                       Banophen        

   [facility]



     mg capsule mg capsule                                    25 mg           Me

dical



                                                  capsule           

 

     indomethaci indomethaci           No                       indomethac      

     [facility]



     n 25 mg n 25 mg                                    in 25 mg           [facility]

al



     capsule capsule                                    capsule           



     TAKE 1 TAKE 1                                    TAKE 1           



     CAPSULE BY CAPSULE BY                                    CAPSULE BY        

   



     MOUTH EVERY MOUTH EVERY                                    MOUTH           



     6 HOURS 6 HOURS                                    EVERY 6           



                                                  HOURS           

 

     mometasone mometasone           No                       mometasone        

   [facility]



     0.1 % 0.1 %                                    0.1 %           [facility]



     topical topical                                    topical           



     cream cream                                    cream           

 

     acetaminoph acetaminoph           No                       acetaminop      

     [facility]



     en 300 en 300                                    hen 300           [facility]



     mg-codeine mg-codeine                                    mg-codeine        

   



     30 mg 30 mg                                    30 mg           



     tablet TAKE tablet TAKE                                    tablet          

 



     1 TABLET BY 1 TABLET BY                                    TAKE 1          

 



     MOUTH EVERY MOUTH EVERY                                    TABLET BY       

    



     6 HOURS AS 6 HOURS AS                                    MOUTH           



     NEEDED FOR NEEDED FOR                                    EVERY 6           



     SEVERE PAIN SEVERE PAIN                                    HOURS AS        

   



                                                  NEEDED FOR           



                                                  SEVERE           



                                                  PAIN           

 

     Banophen 25 Banophen 25           No                       Banophen        

   [facility]



     mg capsule mg capsule                                    25 mg           [facility]

dical



                                                  capsule           

 

     indomethaci indomethaci           No                       indomethac      

     [facility]



     n 25 mg n 25 mg                                    in 25 mg           [facility]

al



     capsule capsule                                    capsule           



     TAKE 1 TAKE 1                                    TAKE 1           



     CAPSULE BY CAPSULE BY                                    CAPSULE BY        

   



     MOUTH EVERY MOUTH EVERY                                    MOUTH           



     6 HOURS 6 HOURS                                    EVERY 6           



                                                  HOURS           

 

     mometasone mometasone           No                       mometasone        

   [facility]



     0.1 % 0.1 %                                    0.1 %           [facility]



     topical topical                                    topical           



     cream cream                                    cream           

 

     acetaminoph acetaminoph           No                       acetaminop      

     [facility]



     en 300 en 300                                    hen 300           [facility]



     mg-codeine mg-codeine                                    mg-codeine        

   



     30 mg 30 mg                                    30 mg           



     tablet TAKE tablet TAKE                                    tablet          

 



     1 TABLET BY 1 TABLET BY                                    TAKE 1          

 



     MOUTH EVERY MOUTH EVERY                                    TABLET BY       

    



     6 HOURS AS 6 HOURS AS                                    MOUTH           



     NEEDED FOR NEEDED FOR                                    EVERY 6           



     SEVERE PAIN SEVERE PAIN                                    HOURS AS        

   



                                                  NEEDED FOR           



                                                  SEVERE           



                                                  PAIN           

 

     Banophen 25 Banophen 25           No                       Banophen        

   [facility]



     mg capsule mg capsule                                    25 mg           Me

dical



                                                  capsule           

 

     indomethaci indomethaci           No                       indomethac      

     [facility]



     n 25 mg n 25 mg                                    in 25 mg           [facility]

al



     capsule capsule                                    capsule           



     TAKE 1 TAKE 1                                    TAKE 1           



     CAPSULE BY CAPSULE BY                                    CAPSULE BY        

   



     MOUTH EVERY MOUTH EVERY                                    MOUTH           



     6 HOURS 6 HOURS                                    EVERY 6           



                                                  HOURS           

 

     mometasone mometasone           No                       mometasone        

   [facility]



     0.1 % 0.1 %                                    0.1 %           [facility]



     topical topical                                    topical           



     cream cream                                    cream           

 

     acetaminoph acetaminoph           No                       acetaminop      

     [facility]



     en 300 en 300                                    hen 300           [facility]



     mg-codeine mg-codeine                                    mg-codeine        

   



     30 mg 30 mg                                    30 mg           



     tablet TAKE tablet TAKE                                    tablet          

 



     1 TABLET BY 1 TABLET BY                                    TAKE 1          

 



     MOUTH EVERY MOUTH EVERY                                    TABLET BY       

    



     6 HOURS AS 6 HOURS AS                                    MOUTH           



     NEEDED FOR NEEDED FOR                                    EVERY 6           



     SEVERE PAIN SEVERE PAIN                                    HOURS AS        

   



                                                  NEEDED FOR           



                                                  SEVERE           



                                                  PAIN           

 

     Banophen 25 Banophen 25           No                       Banophen        

   [facility]



     mg capsule mg capsule                                    25 mg           Me

dical



                                                  capsule           

 

     indomethaci indomethaci           No                       indomethac      

     [facility]



     n 25 mg n 25 mg                                    in 25 mg           [facility]

al



     capsule capsule                                    capsule           



     TAKE 1 TAKE 1                                    TAKE 1           



     CAPSULE BY CAPSULE BY                                    CAPSULE BY        

   



     MOUTH EVERY MOUTH EVERY                                    MOUTH           



     6 HOURS 6 HOURS                                    EVERY 6           



                                                  HOURS           

 

     mometasone mometasone           No                       mometasone        

   [facility]



     0.1 % 0.1 %                                    0.1 %           [facility]



     topical topical                                    topical           



     cream cream                                    cream           

 

     acetaminoph acetaminoph           No                       acetaminop      

     [facility]



     en 300 en 300                                    hen 300           [facility]



     mg-codeine mg-codeine                                    mg-codeine        

   



     30 mg 30 mg                                    30 mg           



     tablet TAKE tablet TAKE                                    tablet          

 



     1 TABLET BY 1 TABLET BY                                    TAKE 1          

 



     MOUTH EVERY MOUTH EVERY                                    TABLET BY       

    



     6 HOURS AS 6 HOURS AS                                    MOUTH           



     NEEDED FOR NEEDED FOR                                    EVERY 6           



     SEVERE PAIN SEVERE PAIN                                    HOURS AS        

   



                                                  NEEDED FOR           



                                                  SEVERE           



                                                  PAIN           

 

     Banophen 25 Banophen 25           No                       Banophen        

   [facility]



     mg capsule mg capsule                                    25 mg           Me

dical



                                                  capsule           

 

     indomethaci indomethaci           No                       indomethac      

     [facility]



     n 25 mg n 25 mg                                    in 25 mg           [facility]

al



     capsule capsule                                    capsule           



     TAKE 1 TAKE 1                                    TAKE 1           



     CAPSULE BY CAPSULE BY                                    CAPSULE BY        

   



     MOUTH EVERY MOUTH EVERY                                    MOUTH           



     6 HOURS 6 HOURS                                    EVERY 6           



                                                  HOURS           

 

     mometasone mometasone           No                       mometasone        

   [facility]



     0.1 % 0.1 %                                    0.1 %           [facility]



     topical topical                                    topical           



     cream cream                                    cream           

 

     acetaminoph acetaminoph           No                       acetaminop      

     [facility]



     en 300 en 300                                    hen 300           [facility]



     mg-codeine mg-codeine                                    mg-codeine        

   



     30 mg 30 mg                                    30 mg           



     tablet TAKE tablet TAKE                                    tablet          

 



     1 TABLET BY 1 TABLET BY                                    TAKE 1          

 



     MOUTH EVERY MOUTH EVERY                                    TABLET BY       

    



     6 HOURS AS 6 HOURS AS                                    MOUTH           



     NEEDED FOR NEEDED FOR                                    EVERY 6           



     SEVERE PAIN SEVERE PAIN                                    HOURS AS        

   



                                                  NEEDED FOR           



                                                  SEVERE           



                                                  PAIN           

 

     Banophen 25 Banophen 25           No                       Banophen        

   [facility]



     mg capsule mg capsule                                    25 mg           [facility]

dical



                                                  capsule           

 

     docusate docusate           No                       docusate           [facility]

[facility]



     sodium 100 sodium 100                                    sodium 100        

   [facility]



     mg capsule mg capsule                                    mg capsule        

   



     TAKE 2 TAKE 2                                    TAKE 2           



     CAPSULES BY CAPSULES BY                                    CAPSULES        

   



     MOUTH AT MOUTH AT                                    BY MOUTH           



     BEDTIME BEDTIME                                    AT BEDTIME           

 

     ibuprofen ibuprofen           No                       ibuprofen           

[facility]



     600 mg 600 mg                                    600 mg           [facility]



     tablet TAKE tablet TAKE                                    tablet          

 



     1 TABLET BY 1 TABLET BY                                    TAKE 1          

 



     MOUTH EVERY MOUTH EVERY                                    TABLET BY       

    



     6 HOURS AS 6 HOURS AS                                    MOUTH           



     NEEDED NEEDED                                    EVERY 6           



     PAIN. (USE PAIN. (USE                                    HOURS AS          

 



     SECOND) SECOND)                                    NEEDED           



                                                  PAIN. (USE           



                                                  SECOND)           

 

     indomethaci indomethaci           No                       indomethac      

     [facility]



     n 25 mg n 25 mg                                    in 25 mg           Medic

al



     capsule capsule                                    capsule           



     TAKE 1 TAKE 1                                    TAKE 1           



     CAPSULE BY CAPSULE BY                                    CAPSULE BY        

   



     MOUTH EVERY MOUTH EVERY                                    MOUTH           



     6 HOURS 6 HOURS                                    EVERY 6           



                                                  HOURS           

 

     mometasone mometasone           No                       mometasone        

   [facility]



     0.1 % 0.1 %                                    0.1 %           Medical



     topical topical                                    topical           



     cream cream                                    cream           

 

     acetaminoph acetaminoph           No                       acetaminop      

     [facility]



     en 300 en 300                                    hen 300           Medical



     mg-codeine mg-codeine                                    mg-codeine        

   



     30 mg 30 mg                                    30 mg           



     tablet TAKE tablet TAKE                                    tablet          

 



     1 TABLET BY 1 TABLET BY                                    TAKE 1          

 



     MOUTH EVERY MOUTH EVERY                                    TABLET BY       

    



     6 HOURS AS 6 HOURS AS                                    MOUTH           



     NEEDED FOR NEEDED FOR                                    EVERY 6           



     SEVERE PAIN SEVERE PAIN                                    HOURS AS        

   



                                                  NEEDED FOR           



                                                  SEVERE           



                                                  PAIN           

 

     Banophen 25 Banophen 25           No                       Banophen        

   [facility]



     mg capsule mg capsule                                    25 mg           Me

dical



                                                  capsule           

 

     indomethaci indomethaci           No                       indomethac      

     [facility]



     n 25 mg n 25 mg                                    in 25 mg           Medic

al



     capsule capsule                                    capsule           



     TAKE 1 TAKE 1                                    TAKE 1           



     CAPSULE BY CAPSULE BY                                    CAPSULE BY        

   



     MOUTH EVERY MOUTH EVERY                                    MOUTH           



     6 HOURS 6 HOURS                                    EVERY 6           



                                                  HOURS           

 

     mometasone mometasone           No                       mometasone        

   [facility]



     0.1 % 0.1 %                                    0.1 %           Medical



     topical topical                                    topical           



     cream cream                                    cream           







Immunizations







           Ordered    Filled     Date       Status     Comments   Source



           Immunization Name Immunization Name                                  

 

           TDAP                  2017 Completed             [facility]



                                 00:00:00                         [facility]

 

           Tdap       Tdap       Unknown    Completed             [facility]

 

           Tdap       Tdap       Unknown    Completed             [facility]

 

           Tdap       Tdap       Unknown    Completed             [facility]

 

           Tdap       Tdap       Unknown    Completed             [facility]

 

           Tdap       Tdap       Unknown    Completed             [facility]

 

           Tdap       Tdap       Unknown    Completed             [facility]

 

           Tdap       Tdap       Unknown    Completed             [facility]

 

           Tdap       Tdap       Unknown    Completed             [facility]

 

           Tdap       Tdap       Unknown    Completed             [facility]

 

           Tdap       Tdap       Unknown    Completed             [facility]







Vital Signs







             Vital Name   Observation Time Observation Value Comments     Source

 

             BP Diastolic 2022 00:00:00 78 mm[Hg]                 [facility]

 

             Height       2022 00:00:00 62 [in_i]                 [facility]

 

             BMI (Body Mass 2022 00:00:00 26.5 kg/m2                [facility]

 Medical



             Index)                                              

 

             BP Systolic  2022 00:00:00 116 mm[Hg]                [facility]

 

             Body Weight  2022 00:00:00 144.8 [lb_av]              [facility] 

[facility]

 

             BP Diastolic 2022-10-31 00:00:00 72 mm[Hg]                 [facility]

 

             Height       2022-10-31 00:00:00 62 [in_i]                 [facility]

 

             BMI (Body Mass 2022-10-31 00:00:00 31.2 kg/m2                [facility]

 Medical



             Index)                                              

 

             BP Systolic  2022-10-31 00:00:00 126 mm[Hg]                [facility]

 

             Body Weight  2022-10-31 00:00:00 170 [lb_av]               [facility]

ed[facility]

 

             BP Diastolic 2022-10-24 00:00:00 72 mm[Hg]                 [facility]

[facility]

 

             Height       2022-10-24 00:00:00 62 [in_i]                 [facility]

[facility]

 

             BMI (Body Mass 2022-10-24 00:00:00 30.5 kg/m2                [facility]

 Medical



             Index)                                              

 

             BP Systolic  2022-10-24 00:00:00 104 mm[Hg]                [facility]

[facility]

 

             Body Weight  2022-10-24 00:00:00 166 [lb_av]               [facility]

[facility]

 

             BP Diastolic 2022-10-20 00:00:00 72 mm[Hg]                 [facility]

[facility]

 

             Height       2022-10-20 00:00:00 62 [in_i]                 [facility]

edical

 

             BMI (Body Mass 2022-10-20 00:00:00 30.4 kg/m2                [facility]

 Medical



             Index)                                              

 

             BP Systolic  2022-10-20 00:00:00 108 mm[Hg]                [facility]

[facility]

 

             Body Weight  2022-10-20 00:00:00 166 [lb_av]               [facility]

edical

 

             BP Diastolic 2022-10-11 00:00:00 76 mm[Hg]                 [facility]

[facility]

 

             Height       2022-10-11 00:00:00 62 [in_i]                 [facility]

[facility]

 

             BMI (Body Mass 2022-10-11 00:00:00 29.8 kg/m2                [facility]

 Medical



             Index)                                              

 

             BP Systolic  2022-10-11 00:00:00 122 mm[Hg]                [facility]

[facility]

 

             Body Weight  2022-10-11 00:00:00 163 [lb_av]               [facility]

[facility]

 

             BP Diastolic 2022-10-04 00:00:00 68 mm[Hg]                 [facility]

[facility]

 

             Height       2022-10-04 00:00:00 62 [in_i]                 [facility]

[facility]

 

             BMI (Body Mass 2022-10-04 00:00:00 29.6 kg/m2                [facility]

 Medical



             Index)                                              

 

             BP Systolic  2022-10-04 00:00:00 122 mm[Hg]                [facility]

ed[facility]

 

             Body Weight  2022-10-04 00:00:00 162 [lb_av]               [facility]

[facility]

 

             BP Diastolic 2022 00:00:00 64 mm[Hg]                 [facility]

[facility]

 

             BP Systolic  2022 00:00:00 104 mm[Hg]                [facility]

[facility]

 

             Body Weight  2022 00:00:00 162 [lb_av]               [facility]

[facility]

 

             BP Diastolic 2022-09-15 00:00:00 74 mm[Hg]                 [facility]

[facility]

 

             Height       2022-09-15 00:00:00 62 [in_i]                 [facility]

[facility]

 

             BMI (Body Mass 2022-09-15 00:00:00 27.4 kg/m2                [facility]

 Medical



             Index)                                              

 

             BP Systolic  2022-09-15 00:00:00 118 mm[Hg]                [facility]

[facility]

 

             Body Weight  2022-09-15 00:00:00 150 [lb_av]               [facility]

[facility]

 

             BP Diastolic 2022 00:00:00 72 mm[Hg]                 [facility]

[facility]

 

             BP Systolic  2022 00:00:00 106 mm[Hg]                [facility]

[facility]

 

             Body Weight  2022 00:00:00 144 [lb_av]               [facility]

[facility]

 

             BP Diastolic 2022 00:00:00 66 mm[Hg]                 [facility]

[facility]

 

             Height       2022 00:00:00 62 [in_i]                 [facility]

[facility]

 

             BMI (Body Mass 2022 00:00:00 26 kg/m2                  [facility]

 Medical



             Index)                                              

 

             BP Systolic  2022 00:00:00 104 mm[Hg]                [facility]

[facility]

 

             Body Weight  2022 00:00:00 142 [lb_av]               [facility]

[facility]

 

             BP Diastolic 2022 00:00:00 60 mm[Hg]                 [facility]

[facility]

 

             Height       2022 00:00:00 62 [in_i]                 [facility]

[facility]

 

             BMI (Body Mass 2022 00:00:00 25.6 kg/m2                [facility]

 Medical



             Index)                                              

 

             BP Systolic  2022 00:00:00 112 mm[Hg]                [facility]

[facility]

 

             Body Weight  2022 00:00:00 140 [lb_av]               [facility]

[facility]

 

             BP Diastolic 2022 00:00:00 78 mm[Hg]                 [facility]

[facility]

 

             Height       2022 00:00:00 62 [in_i]                 [facility]

ed[facility]

 

             BMI (Body Mass 2022 00:00:00 25.3 kg/m2                [facility]

 Medical



             Index)                                              

 

             BP Systolic  2022 00:00:00 116 mm[Hg]                [facility]

edical

 

             Body Weight  2022 00:00:00 138 [lb_av]               [facility]

ed[facility]

 

             BP Diastolic 2022 00:00:00 66 mm[Hg]                 [facility]

[facility]

 

             Height       2022 00:00:00 62 [in_i]                 [facility]

edical

 

             BMI (Body Mass 2022 00:00:00 23.2 kg/m2                [facility]

 Medical



             Index)                                              

 

             BP Systolic  2022 00:00:00 118 mm[Hg]                [facility]

edical

 

             Body Weight  2022 00:00:00 127 [lb_av]               [facility]

edical

 

             BP Diastolic 2022 00:00:00 72 mm[Hg]                 [facility]

edical

 

             Height       2022 00:00:00 62 [in_i]                 [facility]

edical

 

             BMI (Body Mass 2022 00:00:00 22.5 kg/m2                [facility]

 Medical



             Index)                                              

 

             BP Systolic  2022 00:00:00 108 mm[Hg]                [facility]

edical

 

             Body Weight  2022 00:00:00 122 [lb_av]               [facility]

edical

 

             BP Diastolic 2022 00:00:00 64 mm[Hg]                 [facility]

[facility]

 

             Height       2022 00:00:00 62 [in_i]                 [facility]

edical

 

             BMI (Body Mass 2022 00:00:00 22.4 kg/m2                [facility]

 Medical



             Index)                                              

 

             BP Systolic  2022 00:00:00 102 mm[Hg]                [facility]

ed[facility]

 

             Body Weight  2022 00:00:00 122.4 [lb_av]              [facility] 

Medical

 

             Systolic blood 2022 14:01:00 111 mm[Hg]                [facility]

[facility]



             pressure                                            [facility]

 

             Diastolic blood 2022 14:01:00 65 mm[Hg]                 Un[facility]

rsity of



             pressure                                            [facility]

 

             Heart rate   2022 14:01:00 75 /min                   [facility]

ty of



                                                                 [facility]

 

             Body temperature 2022 14:01:00 36.39 Alba                 Univ

ersity of



                                                                 [facility]

 

             Respiratory rate 2022 14:01:00 20 /min                   Univ

ersity of



                                                                 [facility]

 

             Body height  2022 14:01:00 157.5 cm                  [facility]

ty of



                                                                 [facility]



                                                                 [facility]

 

             Body weight  2022 14:01:00 53.615 kg                 [facility]

ty of



                                                                 [facility]



                                                                 [facility]

 

             BMI          2022 14:01:00 21.62 kg/m2               [facility]

ty of



                                                                 [facility]



                                                                 Branch

 

             Systolic blood 2022 14:01:00 111 mm[Hg]                [facility]

sity of



             pressure                                            [facility]

 

             Diastolic blood 2022 14:01:00 65 mm[Hg]                 Un[facility]

rsity of



             pressure                                            [facility]

 

             Heart rate   2022 14:01:00 75 /min                   [facility]

ty of



                                                                 [facility]

 

             Body temperature 2022 14:01:00 36.39 Alba                 Univ

ersity of



                                                                 [facility]

 

             Respiratory rate 2022 14:01:00 20 /min                   Univ

ersity of



                                                                 [facility]

 

             Body height  2022 14:01:00 157.5 cm                  [facility]

ty of



                                                                 [facility]



                                                                 [facility]

 

             Body weight  2022 14:01:00 53.615 kg                 [facility]

ty of



                                                                 [facility]



                                                                 [facility]

 

             BMI          2022 14:01:00 21.62 kg/m2               [facility]

ty [facility]



                                                                 [facility]



                                                                 [facility]







Procedures







                Procedure       Date / Time     Performing Clinician Source



                                Performed                       

 

                [id number]         2022 00:00:00 PACLU.01        [facility]

 

                [id number]         2022 00:00:00 PACLU.01        [facility]

 

                ULTRASOUND RE TO 2022-09-15 00:00:00                 [facility]

[facility]



                EVALUATION OF PREGNANT                                 



                UTERUS PER FETUS                                 

 

                US, obstetric, fetal 2022 00:00:00                 [facility] 

[facility]



                maternal evaluation +                                 



                fetal anatomy                                   

 

                unlisted imaging order 2022 00:00:00                 [facility]

[facility]

 

                ABDOMINAL ULTRASOUND OF 2022 00:00:00                 [facility]

[facility]



                PREGNANT UTERUS (LESS                                 



                THAN 14 WEEKS 0 DAYS)                                 



                SINGLE OR FIRST FETUS                                 

 

                GALV ONLY - VAGINAL 2022 15:06:00 Esau Loja  [facility]

[facility] [place]



                PATHOGENS BY NUCLEIC                                 [facility]

[facility]



                ACID TESTING                                    

 

                POCT URINALYSIS 2022 00:00:00 Katelyn Wilkinson

[facility]

 

                Leep            2016 00:00:00                 [facility]

al

 

                Dilation and Curettage 2016 00:00:00                 [facility]

[facility]

 

                Renal Lithotripsy                                 [facility]

 

                [place]                                        [facility]







Plan of Care







             Planned Activity Planned Date Details      Comments     Source

 

             Diagnostic Test 2022-10-31 00:00:00 urinalysis, dipstick           

   [facility]



             Pending                   [code = urinalysis,              



                                       dipstick]                 







Encounters







        Start   End     Encounter Admission Attending Care    Care    Encounter 

Source



        Date/Time Date/Time Type    Type    Clinicians Facility Department ID   

   

 

        2022         Inpatient EL      LOYD Alas         [id number]

 [facility]



        09:16:00                         [facility]

 

        2022 Inés                   [facility] 2022 [facility]



        00:00:00 00:00:00 Deya Alas -         Medi

amanda



                        MD: 0900                         GC_[facility]_         



                        [facility],                         Office*         



                        [address]                                           



                        [address],                                           



                        [place], [place]                                              



                        [address]                                         



                        , Ph.                                           



                        [phone]                                         

 

        2022 Outpatient         [facility]_ PRIV    PRIV    238

[id number] [facility]



        00:00:00 00:00:00                 Pacheco_L                 [id number] Medi

amanda

 

        2022 Outpatient         [facility]_ PRIV    PRIV    238

[id number] [facility]



        00:00:00 00:00:00                 Pacheco_L                 [id number] Med[facility]

amanda

 

        2022 Outpatient         [facility]_ PRIV    PRIV    238

[id number] [facility]



        00:00:00 00:00:00                 Pacheco_L                 [id number] Medi

amanda

 

        2022 Inpatient EM      LOYD Alas   OBPP    [id number]

[id number] [facility]



        09:14:00 19:35:00                 Inés                   62      [facility]

[facility]



                                                                        [facility]



                                                                        [facility]



                                                                        [place]

 

        2022-10-31 2022-10-31 Inés                   PRIV    [facility]  [facility]



        00:00:00 00:00:00 Alas,                         Health -         Medi

maanda



                        MD: 7900                         GC_[facility]_         



                        [address]          



                        [address],                         Office*         



                        [address]                                           



                        [address],                                           



                        [place], [place]                                              



                        [address]                                         



                        , Ph.                                           



                        [phone]                                           



                        [phone]                                         

 

        2022-10-24 2022-10-24 Inés                   PRIV    [facility]  [facility]



        00:00:00 00:00:00 Alas,                         Health -         Medi

amanda



                        MD: 7900                         GC_[facility]_         



                        [address]          



                        [address],                         Office*         



                        [address]                                           



                        [address],                                           



                        [place], [place]                                              



                        [address]                                         



                        , Ph.                                           



                        [phone]                                           



                        [phone]                                         

 

        2022-10-20 2022-10-20 Inés                   PRIV    [facility]  [facility]



        00:00:00 00:00:00 Alas,                         Health -         Medi

amanda



                        MD: 7900                         [facility]_         



                        [address]          



                        [address],                         Office*         



                        [address]                                           



                        [address],                                           



                        [place], [place]                                              



                        [address]                                         



                        , Ph.                                           



                        [phone]                                           



                        [phone]                                         

 

        2022-10-11 2022-10-11 Inés                   PRIV    [facility]  [facility]



        00:00:00 00:00:00 Alas,                         Health -         Medi

amanda



                        MD: 7900                         GC_[facility]_         



                        [address]          



                        [address],                         Office*         



                        [address]                                           



                        [address],                                           



                        [place], [place]                                              



                        [address]                                         



                        , Ph.                                           



                        [phone]                                           



                        [phone]                                         

 

        2022-10-07 2022-10-07 Emergency EM      [facility] [facility]    [id number]

594 [facility]



        18:14:00 21:46:00                 Inés                   44      Woman'

s



                                                                        Hospita



                                                                        l of



                                                                        [place]

 

        2022-10-04 2022-10-04 Inés                   PRIV    [facility]  [facility]



        00:00:00 00:00:00 Alas,                         Health -         Medi

amanda



                        MD: [address]                         GC_S[facility]_         



                        [address]          



                        [address],                         Office*         



                        [address]                                           



                        [address],                                           



                        [place], [place]                                              



                        [address]                                         



                        , Ph.                                           



                        [phone]                                           



                        153-2022 Outpatient         GC_[facility]WHATBIC PRIV    PRIV    238

[id number] [facility]



        00:00:00 00:00:00                 _Pacheco                 [id number] Medic

al

 

        2022 Inés                   PRIV    [facility]  [facility]



        00:00:00 00:00:00 Alas,                         Health -         Medi

amanda



                        MD: [address]          



                        [address],                         Office*         



                        [address]                                           



                        [address], [place]                                              



                        [address]                                         



                        , Ph.                                           



                        [phone]                                         

 

        2022 Outpatient         GC_[facility] PRIV    PRIV    238

[id number] [facility]



        00:00:00 00:00:00                 _Pacheco                 [id number] Medic

al

 

        2022 Outpatient         [facility] PRIV    PRIV    238

[id number] [facility]



        00:00:00 00:00:00                 _Pacheco                 [id number] Medic

al

 

        2022-09-15 2022-09-15 Outpatient         GC_[facility] PRIV    PRIV    238

[id number] [facility]



        00:00:00 00:00:00                 _Pacheco                 [id number] Medic

al

 

        2022-09-15 2022-09-15 Outpatient         Alas, PRIV    PRIV    [id number]

5e-3 



        00:00:00 00:00:00                 Inés                   [id number] 



                                                                [id number] 



                                                                [id number]  

 

        2022-09-15 2022-09-15 Inés                   PRIV    [facility] 

15 [facility]



        00:00:00 00:00:00 Alas,                         Health -         Medi

amanda



                        MD: [address]          



                        [address],                         Office*         



                        [address]                                           



                        [address], [place]                                              



                        [address]                                         



                        , Ph.                                           



                        [phone]                                         

 

        2022 Outpatient         Alas, PRIV    PRIV    [id number]

c8-2 



        00:00:00 00:00:00                 Inés                   [id number] 



                                                                [id number] 



                                                                [id number]  

 

        2022 Inés                   PRIV    [facility] [id number]

30 [facility]



        00:00:00 00:00:00 Alas,                         Health -         Medi

amanda



                        MD: [address]                         [address]          



                        [address],                         Office*         



                        [address]                                           



                        [address],                                           



                        [place], [place]                                              



                        [address]                                         



                        , Ph.                                           



                        [phone]                                           



                        [phone]                                         

 

        2022 Inés                   PRIV    [facility] [id number]

16 [facility]



        00:00:00 00:00:00 Alas                         Health -         Medi

amanda



                        MD: [address]                         [facility] [address]                          [facility],                         Office*         



                        [address]                                           



                        [address],                                           



                        [place], [place]                                              



                        [address]                                         



                        , Ph.                                           



                        [phone]                                           



                        [phone]                                         

 

        2022 Outpatient         Alas, PRIV    PRIV    [id number]

[id number] 



        00:00:00 00:00:00                 Inés                   [id number] 



                                                                [id number] 



                                                                [id number]  

 

        2022 Outpatient         [facility]_ PRIV    PRIV    238

[id number] [facility]



        00:00:00 00:00:00                 Pacheco_L                 [id number] Medi

amanda

 

        2022 Outpatient         [facility]_ PRIV    PRIV    238

13896-9 [facility]



        10:44:00 10:44:00                 Pacheco_L                 [id number] Medi

amanda

 

        2022 Inés                   PRIV    [facility] [id number]

18 [facility]



        00:00:00 00:00:00 [facility]



                        MD: [address]                         [address]          



                        [address],                         Office*         



                        [address]                                           



                        [address],                                           



                        [place], [place]                                              



                        [address]                                         



                        , Ph.                                           



                        [phone]                                           



                        [phone]                                         

 

        2022 Outpatient         Alas, PRIV    PRIV    [id number]

b4-0 



        00:00:00 00:00:00                 Inés                   [id number] 



                                                                [id number] 



                                                                [id number]  

 

        2022 Outpatient         GC_[facility]_ PRIV    PRIV    238

[id number] [facility]



        04:45:00 04:45:00                 Pacheco_L                 [id number] Medi

amanda

 

        2022 Outpatient         [facility]_ PRIV    PRIV    238

[id number] [facility]



        04:45:00 04:45:00                 Pacheco_L                 [id number] Medi

amanda

 

        2022 Outpatient         [facility]_ PRIV    PRIV    238

72353-0 [facility]



        01:18:00 01:18:00                 Pacheco_L                 [id number] Medi

amanda

 

        2022 Inés                   PRIV    [facility] 

20 [facility]



        00:00:00 00:00:00 Deya Alas -         Medi

amanda



                        MD: [address]                         GC_[facility]_         



                        [facility],                         Office*         



                        [address]                                           



                        [address],                                           



                        [place], [place]                                              



                        [address]                                         



                        , Ph.                                           



                        [phone]                                         

 

        2022 Outpatient         Alas, PRIV    PRIV    [id number]

96-f 



        00:00:00 00:00:00                 Inés                   [id number] 



                                                                [id number] 



                                                                [id number]  

 

        2022 Outpatient         GC_[facility] PRIV    PRIV    238

[id number] [facility]



        03:06:00 03:06:00                 _Pacheco                 [id number] [facility]

 

        2022 Outpatient         GC_[facility]_ PRIV    PRIV    238

[id number] [facility]



        03:06:00 03:06:00                 Pacheco_L                 [id number] Medi

amanda

 

        2022 Outpatient         GC_[facility]_ PRIV    PRIV    238

81029-3 [facility]



        11:11:00 11:11:00                 Pacheco_L                 [id number] Medi

amanda

 

        2022 Outpatient         [facility]_ PRIV    PRIV    238

[id number] [facility]



        11:11:00 11:11:00                 Pacheco_L                 [id number] Medi

amanda

 

        2022 Outpatient         GC_[facility]_ PRIV    PRIV    238

[id number] [facility]



        11:11:00 11:11:00                 Pacheco_L                 [id number] Medi

amanda

 

        2022 Outpatient         [facility]_ PRIV    PRIV    238

[id number] [facility]



        11:11:00 11:11:00                 Pacheco_L                 [id number] Medi

amanda

 

        2022 Inés                   PRIV    [facility]  [facility]



        00:00:00 00:00:00 Bishop                         Health -         Medi

amanda



                        MD: [address]                         GC_[facility]_         



                        Ed                          [facility],                         Office*         



                        [address]                                           



                        [address],                                           



                        [place], [place]                                              



                        [address]                                         



                        , Ph.                                           



                        [phone]                                         

 

        2022 Outpatient         Alas, PRIV    PRIV    [id number]

36-[id number] 



        00:00:00 00:00:00                 Inés                   [id number]-9 



                                                                [id number] 



                                                                [id number]  

 

        2022 Outpatient EL      Alas, [facility]   [id number]

3-20 [facility]



        08:05:00 08:05:00                 Inés                   [id number]  Woman'

s



                                                                        Hospita



                                                                        l of



                                                                        [place]

 

        2022 Outpatient EL      Alas, [facility]   DAYS    [id number]

4871 [facility]



        08:05:00 08:05:00                 Inés                   37      Woman'

s



                                                                        Hospita



                                                                        l of



                                                                        [place]

 

        2022 Outpatient EL      Alas, [facility]   DAYS    [id number]

4871 [facility]



        08:05:00 08:05:00                 Inés                   37      Woman'

s



                                                                        Hospita



                                                                        l of



                                                                        [place]

 

        2022 Outpatient R       MINH, [facility]    [id number]

50764 [facility]



        08:15:00 08:15:00                 KATELYN robles



                                                                        [facility]

 

        2022 Outpatient         GC_[facility]_ PRIV    PRIV    238

[id number] [facility]



        04:16:00 04:16:00                 Pacheco_L                 [id number] Medi

amanda

 

        2022 Outpatient         [facility]_ PRIV    PRIV    238

[id number] [facility]



        04:16:00 04:16:00                 Pacheco_L                 [id number] Medi

amanda

 

        2022 Outpatient         [facility]_ PRIV    PRIV    238

[id number] [facility]



        02:20:00 02:20:00                 Pacheco_L                 [id number] Medi

amanda

 

        2022 Outpatient         GC_[facility]_ PRIV    PRIV    238

[id number] [facility]



        01:25:00 01:25:00                 Pacheco_L                 [id number] Medi

amanda

 

        2022 Inés                   PRIV    [facility]  [facility]



        00:00:00 00:00:00 Alas,                         Health -         Medi

amanda



                        MD: [address]                         GC_[facility]_         



                        [address]          



                        [address],                         Office*         



                        [address]                                           



                        [address],                                           



                        [place], [place]                                              



                        [address]                                         



                        , Ph.                                           



                        [phone]                                         

 

        2022 Outpatient         Alas, PRIV    PRIV    [id number]

1a-c 



        00:00:00 00:00:00                 Inés                   [id number] 



                                                                [id number] 



                                                                [id number]  

 

        2022 Outpatient         [facility]_ PRIV    PRIV    238

[id number] [facility]



        12:42:00 12:42:00                 Pacheco_L                 [id number] Medi

amanda

 

        2022 Outpatient         GC_[facility] PRIV    PRIV    238

[id number] [facility]



        11:22:00 11:22:00                 _Pacheco                 [id number] Medic

al

 

        2022 Outpatient         [facility]_ PRIV    PRIV    238

[id number] [facility]



        12:40:00 12:40:00                 Pacheco_L                 [id number] Medi

amanda

 

        2022 Outpatient         [facility]_ PRIV    PRIV    238

[id number] [facility]



        02:55:00 02:55:00                 Pacheco_L                 [id number] Medi

amanda

 

        2022 Outpatient         [facility]_ PRIV    PRIV    238

[id number] [facility]



        10:26:00 10:26:00                 Pacheco_L                 [id number] Medi

amanda

 

        2022 Outpatient         Alas, PRIV    PRIV    [id number]

d0-b 



        00:00:00 00:00:00                 Inés                   [id number]-11ec-[id number] 



                                                                [id number] 



                                                                [id number]  

 

        2022 Inés                   PRIV    [facility]  [facility]



        00:00:00 00:00:00 Alas,                         Health -         Medi

amanda



                        MD: [address]                         [facility]                         Office*         



                        [address]                                           



                        [address],                                           



                        [place], [place]                                              



                        [address]                                         



                        , Ph.                                           



                        [phone]                                         

 

        2022 Outpatient         [facility]_ PRIV    PRIV    238

[id number] [facility]



        11:55:00 11:55:00                 Pacheco_L                 [id number] Medi

amanda

 

        2022 [facility]    1.2.840.114 [id number]

[id number] [facility]



        00:00:00 00:00:00                 Katelyn SORIANO OB/GYN  350.1.13.10        

 ity of



                                                REGIONAL 4.2.7.2.686         [place]

as



                                                MATERNAL [phone]         Med

ical



                                                & CHILD [address] [place] [address]                 

 

        2022 Outpatient         [facility]_ PRIV    PRIV    238

71675-6 [facility]



        10:45:00 10:45:00                 Pacheco_L                 [id number] Medi

amanda

 

        [facility]    [id number]

6565 [facility]



        09:00:00 10:01:13                 [facility]

 

        2022 Office          Faculty, Peterson Ruffin [facility]    1.2

.840.114 [id number] 

Univers



        09:00:00 10:01:13 Visit           Cris Guzman OB/GYN  350.1.13.10 

        ity of



                                                REGIONAL 4.2.7.2.686         [place]

as



                                                MATERNAL [phone]         Med

ical



                                                & [address]                 

 

        [facility]    [id number]

6565 [facility]



        09:00:00 10:01:13                 CRIS                          [facility]

 

        [facility]    [id number]

6565 [facility]



        09:00:00 10:01:13                 [facility]

 

        2022 Office          Faculty, Peterson [facility]    1.2

.840.114 [id number] 

Univers



        09:00:00 10:01:13 Visit           Cris Guzman OB/GYN  350.1.13.10 

        ity of



                                                REGIONAL 4.2.7.2.686         [place]

as



                                                MATERNAL [phone]         Med

ical



                                                & CHILD [address] [place] [address]                 

 

        2022 Outpatient         _SWHAOM_ PRIV    PRIV    238

[id number] [facility]



        01:45:00 01:45:00                 Pacheco_L                 [id number] Medi

amanda

 

        2022 Abstract         [facility]    1.2.840.114 [id number]

[id number] Univers



        00:00:00 00:00:00                 Katelyn SORIANO OB/GYN  350.1.13.10        

 ity of



                                                REGIONAL 4.2.7.2.686         [place]

as



                                                MATERNAL [id number]         Med

ical



                                                & CHILD [address] [place] [address]                 

 

        2022 Technician         [facility]    1.2.

840.114 [id number] 

Univers



        14:15:00 14:45:00 Visit           Sintia Radford OB/GYN  350.1.13.10   

      ity of



                                                REGIONAL 4.2.7.2.686         [place]

as



                                                MATERNAL [phone]         Med

ical



                                                & CHILD [address]             [facility]                 

 

        2022 Outpatient STEPHANIA RADFORD  [facility]    [id number]

[id number] [facility]



        14:15:00 14:15:00                 SINTIA madrigal [facility]

 

        2022 Outpatient MILLIE RODRIGUEZ [facility]    [id number]

915 [facility]



        10:30:00 10:30:00                 ARSH madrigal o

[facility]

 

        2022 Outpatient R       MINH [facility]    [id number]

[id number] [facility]



        08:15:00 08:18:38                 KATELYN robles



                                                                        [facility]

 

        2022 Technician         [facility]    1.2.840.

114 [id number] 

Univers



        08:15:00 08:18:38 Visit           Katelyn Wilkinson OB/GYN  350.1.13.

10         ity of



                                                REGIONAL 4.2.7.2.686         [place]

as



                                                MATERNAL [id number]         Med

ical



                                                & CHILD [address] [place] [address]                 

 

        2022 Outpatient R       MINH [facility]    [id number]

40742 [facility]



        08:15:00 08:15:00                 KATELYN hummely o

f



                                                                        [facility]

 

        [facility]       [facility]    [id number]

14624 [facility]



        12:45:00 14:35:18                 KATELYN hummely o

f



                                                                        [facility]

 

        2022 Initial         [facility]    1.2.986.236 [id number]

5021 [facility]



        12:45:00 14:35:18 Prenatal         Katelyn SORIANO OB/GYN  350.1.13.10       

  ity of



                        Visit                   REGIONAL 4.2.7.2.686         [place]

as



                                                MATERNAL [id number]         Med

ical



                                                & CHILD [address] [place] [address]                 

 

        20[facility] R       [facility]    [id number]

92551 [facility]



        12:45:00 14:35:18                 KATELYN madrigal o

[facility]

 

        2022 Orders          Doctor FAJARDO    1.2.840.114 [id number]

34 Univers



        00:00:00 00:00:00 Only            Unassigned, NANETET   350.1.13.10       

  ity of



                                        [facility] 4.2.7.2.686         [place]

as



                                                        [phone]         [address]

 

        2021 Office          [facility]    1.2.424.188 [id number]

9406 [facility]



        10:19:40 10:49:40 Visit           Rochelle JAMES OB/GYN  350.1.13.10         it

y of



                                                REGIONAL 4.2.7.2.686         [place]

as



                                                MATERNAL [phone]         Med

ical



                                                & [address]                 

 

        2021 Outpatient MILLIE WILLS [facility]    [id number]

[id number] [facility]



        10:30:00 10:30:00                 ROCHELLE madrigal [facility]

 

        2021 Orders          Doctor FAJARDO    1.2.840.114 [id number]

45 Univers



        00:00:00 00:00:00 Only            Unassigned, NANETTE   350.1.13.10       

  ity of



                                        [facility] 4.2.7.2.686         [place]

as



                                                        [phone]         [address]

 

        2021 Outpatient R       [facility]    [id number]

734 Univers



        15:15:00 15:15:00                 ARSH madrigal o

[facility]

 

        2021 Office          [facility]    1.2.840.114 [id number]

00 Univers



        09:47:46 10:41:43 [facility]           [facility]  350.1.13.10        

 ity of



                                                REGIONAL 4.2.7.2.686         [place]

as



                                                MATERNAL [phone]         Med

ical



                                                & CHILD [address] [place] [address]                 

 

        2021 Outpatient R       [facility]    [id number]

289 Univers



        10:00:00 10:00:00                 ARSH madrigal o

[facility]

 

        2021 Orders          Doctor  SCOTTY    1.2.840.114 [id number]

26 Univers



        00:00:00 00:00:00 Only            Unassigned, NANETTE   350.1.13.10       

  ity of



                                        [facility] 4.2.7.2.686         [place]

as



                                                        [phone]         [address]

 

        2021 Case            Tramaine,  UNIVERSIT [id number]

1757 Univers



        00:00:00 00:00:00 Management         [facility] 350.1.13.10       

  ity of



                                                CLINICS 4.2.7.2.686         [place]

s



                                                        [phone]         [address]

 

        2021 Telephone         Andrea,  UNIVERSIT 1.2.840.114 [id number]

[id number] Univers



        00:00:00 00:00:00                 [facility] 350.1.13.10         i

ty of



                                                CLINICS 4.2.7.2.686         [place]

s



                                                        [phone]         [address]

 

        2021 Office          Res-Colpo/Leep, [facility] UNIVERSI

T [id number] 

[id number]                                Univers



        08:17:01 09:16:12 Visit           Chepe Chacko [facility] 350.1.13.10 

        ity of



                                                CLINICS 4.2.7.2.686         [place]

s



                                                        [phone]         [address]

 

        2021 Outpatient R               [facility]    [id number]

115 Univers



        08:00:00 08:00:00                                                 ity of



                                                                        [facility]

 

        2021 Orders          Doctor  SCOTTY    1.2.840.114 [id number]

61 Univers



        00:00:00 00:00:00 Only            Unassigned, NANETTE   350.1.13.10       

  ity of



                                        [facility] 4.2.7.2.686         [place]

as



                                                        [phone]         [address]

 

        2020-11-15 2020-11-15 Case            TramaineRANJAN [id number]

0355 Univers



        00:00:00 00:00:00 Management         [facility] 350.1.13.10       

  ity of



                                                CLINICS 4.2.7.2.686         [place]

s



                                                        [phone]         [address]

 

        2020-10-22 2020-10-22 Outpatient R               [facility]    [id number]

890 Univers



        10:00:00 10:00:00                                                 ity [facility]

 

        2020 Telephone         [facility]    1.2.840.114 [id number]

[id number] Univers



        00:00:00 00:00:00                 Rochelle JAMES OB/GYN  350.1.13.10         it

y of



                                                REGIONAL 4.2.7.2.686         [place]

as



                                                MATERNAL [phone]         Med

ical



                                                & CHILD [address] [place] [address]                 

 

        2020 Office          [facility]    [id number]

1734 Univers



        12:45:39 13:00:39 Visit           Rochelle JAMES OB/GYN  350.1.13.10         it

y of



                                                REGIONAL 4.2.7.2.686         [place]

as



                                                MATERNAL [phone]         Med

ical



                                                & CHILD [address] [place] [address]                 

 

        2020 Outpatient R       [facility]    [id number]

[id number] Univers



        12:45:00 12:45:00                 ROCHELLECAROLA madrigal [facility]

 

        2020 [facility] R       [facility]    [id number]

83540 Univers



        12:45:00 12:45:00                 ROCHELLE                           rohan [facility]

 

        2020 Telephone         [facility]    1.2.840.114 [id number]

[id number] Univers



        00:00:00 00:00:00                 Rochelle JAMES OB/GYN  350.1.13.10         it

y of



                                                REGIONAL 4.2.7.2.686         [place]

as



                                                MATERNAL [phone]         Med

ical



                                                & CHILD [address] [place] [address]                 

 

        2019 Case            [facility]    [id number]

2264 Univers



        00:00:00 00:00:00 Management         Rochelle JAMES OB/GYN  350.1.13.10        

 ity of



                                                REGIONAL 4.2.7.2.686         [place]

as



                                                MATERNAL [phone]         Med

ical



                                                & CHILD [address] [place] [address]                 

 

        2019 Office          [facility]    [id number]

7777 [facility]



        08:20:14 09:14:54 Visit           Rochelle JAMES OB/GYN  350.1.13.10         it

y of



                                                REGIONAL 4.7.2.686         [place]

as



                                                MATERNAL [phone]         Med

ical



                                                & CHILD [address] [place] [address]                 

 

        2019 Orders          Doctor  SCOTTY    1.2.840.114 [id number]

06 Univers



        00:00:00 00:00:00 Only            Unassigned, NANETTE   350.1.13.10       

  ity of



                                        [facility] 4.7.2.686         [place]

as



                                                        [phone]         [address]

 

        2019 Office          [facility]    [id number]

3443 Univers



        12:45:16 13:34:07 Visit           Rochelle JAMES OB/GYN  350.1.13.10         it

y of



                                                REGIONAL 42.7.2.686         [place]

as



                                                MATERNAL [phone]         Med

ical



                                                & CHILD [address] [place] [address]                 

 

        2019 Orders          Doctor  SCOTTY    1.2.840.114 [id number]

58 Univers



        00:00:00 00:00:00 Only            Unassigned, NANETTE   350.1.13.10       

  ity of



                                        [facility] 4.2.7.2.686         [place]

as



                                                        [phone]         Medi

amanda



                                                        009             Branch







Results







           Test Description Test Time  Test Comments Results    Result Comments 

Source









                    HGB HCT             2022 09:15:00 









                      Test Item  Value      Reference Range Interpretation Comme

nts









             HEMOGLOBIN (test code = HGB) 11.5 g/dL    10.1-13.8    N           

 

 

             HEMATOCRIT (test code = HCT) 35.3 %       32.5-41.8    N           

 



AG HEPATITIS B [id number] 13:43:00





             Test Item    Value        Reference Range Interpretation Comments

 

             AG HEPATITIS B SURFACE (test code NONREACTIVE  NONREACTIVE         

      



             = HBSAG)                                            



AB HEPATITIS C [id number] 13:43:00





             Test Item    Value        Reference Range Interpretation Comments

 

             AB HEPATITIS C (test code = NONREACTIVE  NONREACTIVE               



             HCVAB)                                              

 

             SIGNAL TO CUTOFF (test code = 0.09         <0.80        N          

  



             CUTOFF)                                             



AB [id number] 13:43:00





             Test Item    Value        Reference Range Interpretation Comments

 

             AB TREPONEMA (test code = TREPAB) NONREACTIVE  NONREACTIVE         

      



AB HIV 1  13:43:00





             Test Item    Value        Reference Range Interpretation Comments

 

             AB HIV 1 2 (test NONREACTIVE  NONREACTIVE               [facility]



             code = [id number])                                        4th Gen HIV 

Ag/Ab Combo



                                                                 Screen



CBC W/AUTO [id number] 10:00:00





             Test Item    Value        Reference Range Interpretation Comments

 

             WHITE BLOOD CELL (test code = WBC) 17.9 K/mm3   6.5-12.3     H     

       

 

             RED BLOOD CELL (test code = RBC) 4.14 M/mm3   3.51-4.69    N       

     

 

             HEMOGLOBIN (test code = HGB) 12.5 g/dL    10.1-13.8    N           

 

 

             HEMATOCRIT (test code = HCT) 37.1 %       32.5-41.8    N           

 

 

             MEAN CELL VOLUME (test code = MCV) 89.6 fL      84.6-96.6    N     

       

 

             MEAN CELL HGB (test code = MCH) 30.2 pg      27.3-33.9    N        

    

 

             MEAN CELL HGB CONCETRATION (test 33.7 gm/dL   32.0-34.2    N       

     



             code = MCHC)                                        

 

             RED CELL DISTRIBUTION WIDTH (test 14.0 %       12.2-16.3    N      

      



             code = RDW)                                         

 

             PLATELET COUNT (test code = PLT) 187 K/mm3    134-363      N       

     

 

             MEAN PLATELET VOLUME (test code = 10.8 fL      9.2-12.7     N      

      



             MPV)                                                

 

             NEUTROPHIL % (test code = NT%) 80.8 %       57.9-77.3    H         

   

 

             LYMPHOCYTE % (test code = LY%) 9.5 %        14.5-29.7    L         

   

 

             MONOCYTE % (test code = MO%) 6.5 %        3.6-10.2     N           

 

 

             EOSINOPHIL % (test code = EO%) 1.7 %        0.0-3.0      N         

   

 

             BASOPHIL % (test code = BA%) 0.5 %        0.1-0.9      N           

 

 

             NEUTROPHIL # (test code = NT#) 14.5 K/mm3                          

   

 

             LYMPHOCYTE # (test code = LY#) 1.7 K/mm3                           

   

 

             MONOCYTE # (test code = MO#) 1.2 K/mm3                             

 

 

             EOSINOPHIL # (test code = EO#) 0.30 K/mm3                          

   

 

             BASOPHIL # (test code = BA#) 0.1 K/mm3                             

 

 

             RBC MORPHOLOGY REQUIRED (test code NORMAL       NORMAL             

       



             = RBCM)                                             

 

             PLATELET MORPHOLOGY REQUIRED (test NORMAL       NORMAL             

       



             code = PLTMR)                                        



Urinalysis macro (dipstick) panel - Urine2022-10-31 09:28:00





             Test Item    Value        Reference Range Interpretation Comments

 

             Leukocytes (test code = Leukocytes) Negative                       

        

 

             Nitrite (test code = Nitrite) negative                             

  

 

             Protein (test code = Protein) Negative                             

  

 

             Glucose (test code = Glucose) Negative                             

  



[facility] - Urine2022-10-31 09:28:00





             Test Item    Value        Reference Range Interpretation Comments

 

             Leukocytes (test code = Leukocytes) Negative                       

        

 

             Nitrite (test code = Nitrite) negative                             

  

 

             Protein (test code = Protein) Negative                             

  

 

             Glucose (test code = Glucose) Negative                             

  



[facility] panel - Urine2022-10-24 13:45:00





             Test Item    Value        Reference Range Interpretation Comments

 

             Protein (test code = Protein) Negative                             

  

 

             Glucose (test code = Glucose) Negative                             

  



[facility]dip[facility] panel - Urine2022-10-20 10:52:00





             Test Item    Value        Reference Range Interpretation Comments

 

             Protein (test code = Protein) Trace                                

  

 

             Glucose (test code = Glucose) Negative                             

  



[facility] macro [facility]dipstick[facility] panel - Urine2022-10-20 10:52:00





             Test Item    Value        Reference Range Interpretation Comments

 

             Protein (test code = Protein) Trace                                

  

 

             Glucose (test code = Glucose) Negative                             

  



[facility] macro [facility]dipstick[facility] panel - Urine2022-10-11 14:14:00





             Test Item    Value        Reference Range Interpretation Comments

 

             Leukocytes (test code = Leukocytes) 1+                             

        

 

             Nitrite (test code = Nitrite) negative                             

  

 

             Protein (test code = Protein) Trace                                

  

 

             Glucose (test code = Glucose) Negative                             

  



[facility] macro [facility]dipstick[facility] panel - Urine2022-10-11 14:14:00





             Test Item    Value        Reference Range Interpretation Comments

 

             Leukocytes (test code = Leukocytes) 1+                             

        

 

             Nitrite (test code = Nitrite) negative                             

  

 

             Protein (test code = Protein) Trace                                

  

 

             Glucose (test code = Glucose) Negative                             

  



[facility] macro [facility]dipstick[facility] panel - Urine2022-10-11 14:14:00





             Test Item    Value        Reference Range Interpretation Comments

 

             Leukocytes (test code = Leukocytes) 1+                             

        

 

             Nitrite (test code = Nitrite) negative                             

  

 

             Protein (test code = Protein) Trace                                

  

 

             Glucose (test code = Glucose) Negative                             

  



[facility] macro (dipstick) panel - Urine2022-10-04 16:39:00





             Test Item    Value        Reference Range Interpretation Comments

 

             Protein (test code = Protein) Negative                             

  

 

             Glucose (test code = Glucose) Negative                             

  



[facility] macro [facility]dipstick[facility] panel - Urine2022-10-04 16:39:00





             Test Item    Value        Reference Range Interpretation Comments

 

             Protein (test code = Protein) Negative                             

  

 

             Glucose (test code = Glucose) Negative                             

  



[facility] macro (dipstick) panel - Urine2022-10-04 16:39:00





             Test Item    Value        Reference Range Interpretation Comments

 

             Protein (test code = Protein) Negative                             

  

 

             Glucose (test code = Glucose) Negative                             

  



[facility] macro (dipstick) panel - Urine2022-10-04 16:39:00





             Test Item    Value        Reference Range Interpretation Comments

 

             Protein (test code = Protein) Negative                             

  

 

             Glucose (test code = Glucose) Negative                             

  



[facility] Medical[facility]reptococcus agalactiae DNA [Presence] in Specimen by SHAHANA with 
probe detection2022-10-01 00:00:00





             Test Item    Value        Reference Range Interpretation Comments

 

             strep grp.B, DNA (test code = strep negative     negative          

        



             grp.B, DNA)                                         



[facility] MedicalStreptococcus agalactiae DNA [Presence] in Specimen by SHAHANA with 
probe detection2022-10-01 00:00:00





             Test Item    Value        Reference Range Interpretation Comments

 

             strep grp.B, DNA (test code = strep negative     negative          

        



             grp.B, DNA)                                         



[facility] MedicalStreptococcus agalactiae DNA [Presence] in Specimen by SHAHANA with 
probe detection2022-10-01 00:00:00





             Test Item    Value        Reference Range Interpretation Comments

 

             strep grp.B, DNA (test code = strep negative     negative          

        



             grp.B, DNA)                                         



[facility] MedicalStreptococcus agalactiae DNA [Presence] in Specimen by SHAHANA with 
probe detection2022-10-01 00:00:00





             Test Item    Value        Reference Range Interpretation Comments

 

             strep grp.B, DNA (test code = strep negative     negative          

        



             grp.B, DNA)                                         



[facility]inal[facility] macro (dipstick) panel - [id number] 09:26:00





             Test Item    Value        Reference Range Interpretation Comments

 

             Protein (test code = Protein) Negative                             

  

 

             Glucose (test code = Glucose) Negative                             

  



[facility]inal[facility] macro (dipstick) panel - [id number] 09:26:00





             Test Item    Value        Reference Range Interpretation Comments

 

             Protein (test code = Protein) Negative                             

  

 

             Glucose (test code = Glucose) Negative                             

  



[facility] Medical[facility]inalysis macro (dipstick) panel - [id number] 09:26:00





             Test Item    Value        Reference Range Interpretation Comments

 

             Protein (test code = Protein) Negative                             

  

 

             Glucose (test code = Glucose) Negative                             

  



[facility]inal[facility] macro (dipstick) panel - [id number] 09:26:00





             Test Item    Value        Reference Range Interpretation Comments

 

             Protein (test code = Protein) Negative                             

  

 

             Glucose (test code = Glucose) Negative                             

  



[facility] panel - Blood by Automated [id number] 00:00:00





             Test Item    Value        Reference Range Interpretation Comments

 

             WBC (test code = WBC) 12.13 x10(3)/uL 4.00-10.10   H            

 

             RBC (test code = RBC) 3.89 x10(6)/uL 3.58-5.19                 

 

             HGB (test code = HGB) 11.7 g/dL    11.0-15.5                 

 

             HCT (test code = HCT) 34.1 %       31.5-44.8                 

 

             MCV (test code = MCV) 87.7 fL      78.0-98.0                 

 

             MCH (test code = MCH) 30.1 pg      25.2-32.6                 

 

             MCHC (test code = MCHC) 34.3 g/dL    31.0-34.7                 

 

             RDW (test code = RDW) 12.7 %       12.0-15.5                 

 

             platelet count (test code = 192 x10(3)/uL 140-425                  

 



             platelet count)                                        

 

             MPV (test code = MPV) 11.1 fL      8.6-12.1                  



[facility] MedicalHIV 1+2 Ab+HIV1 p24 Ag [Presence] in Serum or Plasma by 
[id number] 00:00:00





             Test Item    Value        Reference Range Interpretation Comments

 

             HIV Ag/Ab (test code = HIV non-reactive non-reactive              



             Ag/Ab)                                              



[facility] panel - Blood by Automated [id number] 00:00:00





             Test Item    Value        Reference Range Interpretation Comments

 

             WBC (test code = WBC) 12.13 x10(3)/uL 4.00-10.10   H            

 

             RBC (test code = RBC) 3.89 x10(6)/uL 3.58-5.19                 

 

             HGB (test code = HGB) 11.7 g/dL    11.0-15.5                 

 

             HCT (test code = HCT) 34.1 %       31.5-44.8                 

 

             MCV (test code = MCV) 87.7 fL      78.0-98.0                 

 

             MCH (test code = MCH) 30.1 pg      25.2-32.6                 

 

             MCHC (test code = MCHC) 34.3 g/dL    31.0-34.7                 

 

             RDW (test code = RDW) 12.7 %       12.0-15.5                 

 

             platelet count (test code = 192 x10(3)/uL 140-425                  

 



             platelet count)                                        

 

             MPV (test code = MPV) 11.1 fL      8.6-12.1                  



[facility] MedicalHIV 1+2 Ab+HIV1 p24 Ag [Presence] in Serum or Plasma by 
[id number] 00:00:00





             Test Item    Value        Reference Range Interpretation Comments

 

             HIV Ag/Ab (test code = HIV non-reactive non-reactive              



             Ag/Ab)                                              



[facility] panel - Blood by Automated [id number] 00:00:00





             Test Item    Value        Reference Range Interpretation Comments

 

             WBC (test code = WBC) 12.13 x10(3)/uL 4.00-10.10   H            

 

             RBC (test code = RBC) 3.89 x10(6)/uL 3.58-5.19                 

 

             HGB (test code = HGB) 11.7 g/dL    11.0-15.5                 

 

             HCT (test code = HCT) 34.1 %       31.5-44.8                 

 

             MCV (test code = MCV) 87.7 fL      78.0-98.0                 

 

             MCH (test code = MCH) 30.1 pg      25.2-32.6                 

 

             MCHC (test code = MCHC) 34.3 g/dL    31.0-34.7                 

 

             RDW (test code = RDW) 12.7 %       12.0-15.5                 

 

             platelet count (test code = 192 x10(3)/uL 140-425                  

 



             platelet count)                                        

 

             MPV (test code = MPV) 11.1 fL      8.6-12.1                  



[facility] 1+2 Ab+HIV1 p24 Ag [Presence] in Serum or Plasma by 
[id number] 00:00:00





             Test Item    Value        Reference Range Interpretation Comments

 

             HIV Ag/Ab (test code = HIV non-reactive non-reactive              



             Ag/Ab)                                              



[facility]inal[facility] macro (dipstick) panel - Urine2022-09-15 15:19:00





             Test Item    Value        Reference Range Interpretation Comments

 

             Leukocytes (test code = Leukocytes) 1+                             

        

 

             Nitrite (test code = Nitrite) negative                             

  

 

             Protein (test code = Protein) Trace                                

  

 

             Glucose (test code = Glucose) Negative                             

  



[facility] - Urine2022-09-15 15:19:00





             Test Item    Value        Reference Range Interpretation Comments

 

             Leukocytes (test code = Leukocytes) 1+                             

        

 

             Nitrite (test code = Nitrite) negative                             

  

 

             Protein (test code = Protein) Trace                                

  

 

             Glucose (test code = Glucose) Negative                             

  



[facility] - Urine2022-09-15 15:19:00





             Test Item    Value        Reference Range Interpretation Comments

 

             Leukocytes (test code = Leukocytes) 1+                             

        

 

             Nitrite (test code = Nitrite) negative                             

  

 

             Protein (test code = Protein) Trace                                

  

 

             Glucose (test code = Glucose) Negative                             

  



[facility] [Presence[facility] in Serum by RPR2022-09-15 00:00:00





             Test Item    Value        Reference Range Interpretation Comments

 

             ethnicity: (test code = not provided                           



             ethnicity:)                                         

 

             race: (test code = race:) unknown                                

 

             RPR (test code = RPR) non-reactive non-reactive              



[facility] [Presence] in Serum by RPR2022-09-15 00:00:00





             Test Item    Value        Reference Range Interpretation Comments

 

             ethnicity: (test code = not provided                           



             ethnicity:)                                         

 

             race: (test code = race:) unknown                                

 

             RPR (test code = RPR) non-reactive non-reactive              



[facility] [Presence] in Serum by RPR2022-09-15 00:00:00





             Test Item    Value        Reference Range Interpretation Comments

 

             ethnicity: (test code = not provided                           



             ethnicity:)                                         

 

             race: (test code = race:) unknown                                

 

             RPR (test code = RPR) non-reactive non-reactive              



[facility]lamydia trachomatis+Neisseria gonorrhoeae rRNA [Presence] in 
Urine by Probe2022-09-15 00:00:00





             Test Item    Value        Reference Range Interpretation Comments

 

             aptima combo 2 urine (CT) (test code = CT neg       negative       

           



             aptima combo 2 urine (CT))                                        

 

             aptima combo 2 urine (GC) (test code = GC neg       negative       

           



             aptima combo 2 urine (GC))                                        



[facility]lamydia trachomatis+Neisseria gonorrhoeae rRNA [Presence] in 
Urine by Probe2022-09-15 00:00:00





             Test Item    Value        Reference Range Interpretation Comments

 

             aptima combo 2 urine (CT) (test code = CT neg       negative       

           



             aptima combo 2 urine (CT))                                        

 

             aptima combo 2 urine (GC) (test code = GC neg       negative       

           



             aptima combo 2 urine (GC))                                        



[facility]lamydia trachomatis+Neisseria gonorrhoeae rRNA [Presence] in 
Urine by Probe2022-09-15 00:00:00





             Test Item    Value        Reference Range Interpretation Comments

 

             aptima combo 2 urine (CT) (test code = CT neg       negative       

           



             aptima combo 2 urine (CT))                                        

 

             aptima combo 2 urine (GC) (test code = GC neg       negative       

           



             aptima combo 2 urine (GC))                                        



[facility] macro (dipstick[facility] panel - [id number] 16:48:00





             Test Item    Value        Reference Range Interpretation Comments

 

             Leukocytes (test code = Leukocytes) 1+                             

        

 

             Nitrite (test code = Nitrite) negative                             

  

 

             Protein (test code = Protein) Trace                                

  

 

             Glucose (test code = Glucose) Negative                             

  



[facility]dipstick[facility] panel - [id number] 16:48:00





             Test Item    Value        Reference Range Interpretation Comments

 

             Leukocytes (test code = Leukocytes) 1+                             

        

 

             Nitrite (test code = Nitrite) negative                             

  

 

             Protein (test code = Protein) Trace                                

  

 

             Glucose (test code = Glucose) Negative                             

  



[facility]dipstick[facility] panel - [id number] 16:24:00





             Test Item    Value        Reference Range Interpretation Comments

 

             Leukocytes (test code = Leukocytes) Negative                       

        

 

             Nitrite (test code = Nitrite) negative                             

  

 

             Protein (test code = Protein) Trace                                

  

 

             Glucose (test code = Glucose) Negative                             

  



[facility]dipstick[facility] panel - [id number] 16:24:00





             Test Item    Value        Reference Range Interpretation Comments

 

             Leukocytes (test code = Leukocytes) Negative                       

        

 

             Nitrite (test code = Nitrite) negative                             

  

 

             Protein (test code = Protein) Trace                                

  

 

             Glucose (test code = Glucose) Negative                             

  



[facility]dipstick[facility] panel - [id number] 10:11:00





             Test Item    Value        Reference Range Interpretation Comments

 

             Protein (test code = Protein) Negative                             

  

 

             Glucose (test code = Glucose) Negative                             

  



[facility] panel - Blood by Automated [id number] 00:00:00





             Test Item    Value        Reference Range Interpretation Comments

 

             WBC (test code = WBC) 11.2 10      3.7-12.0                  

 

             RBC (test code = RBC) 3.70 10      3.60-5.50                 

 

             HGB (test code = HGB) 11.3 g/dL    11.5-15.6    L            

 

             HCT (test code = HCT) 33.7 %       34.5-46.5    L            

 

             MCV (test code = MCV) 90.9 um      80.0-102.0                

 

             MCH (test code = MCH) 30.5 pg      25.0-34.1                 

 

             MCHC (test code = MCHC) 33.5 g/dL    29.0-35.0                 

 

             RDW (test code = RDW) 13.4 %       10.9-16.9                 

 

             plt (test code = plt) 213 10       136-392                   

 

             MPV (test code = MPV) 7.8 um       7.4-11.1                  

 

             gran % (test code = gran %) 81.9 %       36.0-78.0    H            

 

             lymph % (test code = lymph %) 10.3 %       12.0-48.0    L          

  

 

             mono % (test code = mono %) 4.6 %        0.0-13.0                  

 

             eos % (test code = eos %) 3 %          0-8                       

 

             baso % (test code = baso %) 0 %          0-2                       

 

             gran # (test code = gran #) 9.2 10       1.2-6.8      H            

 

             lymph # (test code = lymph #) 1.2 10       1.2-3.2                 

  

 

             mono # (test code = mono #) 0.5 10       0.3-0.8                   

 

             eos # (test code = eos #) 0.3 10       0.0-0.2      H            

 

             baso # (test code = baso #) 0.0 10       0.0-0.2                   



[facility]Glucose [Mass/volume] in Serum or Plasma --1 hour post dose 
[id number] 00:00:00





             Test Item    Value        Reference Range Interpretation Comments

 

             g.T.T. 1HR(50) (test code = g.T.T. 107 mg/dL    <140               

       



             1HR(50))                                            



[facility] macro (dipstick) panel - [id number] 16:10:00





             Test Item    Value        Reference Range Interpretation Comments

 

             Protein (test code = Protein) Negative                             

  

 

             Glucose (test code = Glucose) Negative                             

  



[facility] macro (dipstick) panel - [id number] 09:37:00





             Test Item    Value        Reference Range Interpretation Comments

 

             Leukocytes (test code = Leukocytes) Negative                       

        

 

             Nitrite (test code = Nitrite) negative                             

  

 

             Protein (test code = Protein) Negative                             

  

 

             Glucose (test code = Glucose) Negative                             

  



[facility]OVID [facility] Asymptomatic [facility] [id number] 09:17:00





             Test Item    Value        Reference Range Interpretation Comments

 

             COVID 19     NEGATIVE     NEGATIVE                  This test has b

een



             Asymptomatic [facility]                                        authorize

d only for the



             (test code =                                        detection ofpro

teins from



             COVNONPUIAG)                                        SARS-CoV-2, not

 for any



                                                                 other viruses



                                                                 orpathogens. Ne

gative



                                                                 results should 

be treated



                                                                 as presumptive



                                                                 andconfirmed wi

th a



                                                                 molecular assay

, if



                                                                 necessary for



                                                                 patientmanageme

nt.



                                                                 Negative result

s do not



                                                                 rule out COVID-

19



                                                                 andshould not b

e used as



                                                                 the sole basis 

for



                                                                 treatment orpat

ient



                                                                 management deci

sions,



                                                                 including infec

tion



                                                                 controldecision

s.



                                                                 Negative result

s should



                                                                 be considered i

n



                                                                 thecontext of a

 patient's



                                                                 recent exposure

s, history



                                                                 and thepresence

 of



                                                                 clinical signs 

and



                                                                 symptoms consis

tent



                                                                 withCOVID-19. T

his test



                                                                 has not been FD

A cleared



                                                                 or approved; th

e test



                                                                 hasbeen authorclyde soler by FDA



                                                                 under an Emer[employer]

ncy Use



                                                                 Authorization(E

UA) for



                                                                 use by yunier austin



                                                                 certified under

 the CLIA



                                                                 thatmeet the re

quirements



                                                                 to perform mode

rate, high



                                                                 or waivedcomple

xity



                                                                 tests. This margarita

t is



                                                                 authorized for 

use at



                                                                 thePoint of Car

e (POC),



                                                                 i.e., in patien

t care



                                                                 settingsoperati

ng under a



                                                                 CLIA Certificat

e of



                                                                 Waiver, Certifi

damon



                                                                 ofCompliance, o

r



                                                                 Certificate of



                                                                 Accreditation. 

This test



                                                                 is only authori

karlene for



                                                                 the duration of



                                                                 thedeclaration 

that



                                                                 circumstances e

xist



                                                                 justifying



                                                                 theauthorizatio

n of



                                                                 emergency use o

f in vitro



                                                                 diagnostic test

sfor



                                                                 detection and/o

r



                                                                 diagnosis of CO

VID-19



                                                                 under Audrzmu69

4(b)(1) of



                                                                 the Act, 21 U.S

.C.



                                                                 360bbb-3(b)(1),

 unless



                                                                 theauthorizatio

n is



                                                                 terminated or r

evoked



                                                                 sooner.



CBC W/AUTO [id number] 09:01:00





             Test Item    Value        Reference Range Interpretation Comments

 

             WHITE BLOOD CELL (test code = WBC) 11.0 K/mm3   6.5-12.3     N     

       

 

             RED BLOOD CELL (test code = RBC) 3.78 M/mm3   3.51-4.69    N       

     

 

             HEMOGLOBIN (test code = HGB) 11.3 g/dL    10.1-13.8    N           

 

 

             HEMATOCRIT (test code = HCT) 34.2 %       32.5-41.8    N           

 

 

             MEAN CELL VOLUME (test code = MCV) 90.5 fL      84.6-96.6    N     

       

 

             MEAN CELL HGB (test code = MCH) 29.9 pg      27.3-33.9    N        

    

 

             MEAN CELL HGB CONCETRATION (test 33.0 gm/dL   32.0-34.2    N       

     



             code = MCHC)                                        

 

             RED CELL DISTRIBUTION WIDTH (test 13.1 %       12.2-16.3    N      

      



             code = RDW)                                         

 

             PLATELET COUNT (test code = PLT) 207 K/mm3    134-363      N       

     

 

             MEAN PLATELET VOLUME (test code = 9.0 fL       9.2-12.7     L      

      



             MPV)                                                

 

             NEUTROPHIL % (test code = NT%) 72.6 %       57.9-77.3    N         

   

 

             LYMPHOCYTE % (test code = LY%) 15.3 %       14.5-29.7    N         

   

 

             MONOCYTE % (test code = MO%) 5.8 %        3.6-10.2     N           

 

 

             EOSINOPHIL % (test code = EO%) 5.0 %        0.0-3.0      H         

   

 

             BASOPHIL % (test code = BA%) 0.8 %        0.1-0.9      N           

 

 

             NEUTROPHIL # (test code = NT#) 8.0 K/mm3                           

   

 

             LYMPHOCYTE # (test code = LY#) 1.7 K/mm3                           

   

 

             MONOCYTE # (test code = MO#) 0.6 K/mm3                             

 

 

             EOSINOPHIL # (test code = EO#) 0.55 K/mm3                          

   

 

             BASOPHIL # (test code = BA#) 0.1 K/mm3                             

 

 

             RBC MORPHOLOGY REQUIRED (test code NORMAL       NORMAL             

       



             = RBCM)                                             

 

             PLATELET MORPHOLOGY REQUIRED (test NORMAL       NORMAL             

       



             code = PLTMR)                                        



CBC W Auto Differential panel - [id number] 08:45:00





             Test Item    Value        Reference Range Interpretation Comments

 

             white blood cell (test code = 11.0 K/mm3   6.5-12.3                

  



             white blood cell)                                        

 

             red blood cell (test code = red 3.78 M/mm3   3.51-4.69             

    



             blood cell)                                         

 

             hemoglobin (test code = 11.3 g/dL    10.1-13.8                 



             hemoglobin)                                         

 

             hematocrit (test code = 34.2 %       32.5-41.8                 



             hematocrit)                                         

 

             mean cell volume (test code = mean 90.5 fL      84.6-96.6          

       



             cell volume)                                        

 

             mean cell HGB (test code = mean 29.9 pg      27.3-33.9             

    



             cell HGB)                                           

 

             mean cell HGB concetration (test 33.0 gm/dL   32.0-34.2            

     



             code = mean cell HGB concetration)                                 

       

 

             red cell distribution width (test 13.1 %       12.2-16.3           

      



             code = red cell distribution                                       

 



             width)                                              

 

             platelet count (test code = 207 K/mm3    134-363                   



             platelet count)                                        

 

             mean platelet volume (test code = 9.0 fL       9.2-12.7     L      

      



             mean platelet volume)                                        

 

             neutrophil % (test code = 72.6 %       57.9-77.3                 



             neutrophil %)                                        

 

             lymphocyte % (test code = 15.3 %       14.5-29.7                 



             lymphocyte %)                                        

 

             monocyte % (test code = monocyte 5.8 %        3.6-10.2             

     



             %)                                                  

 

             eosinophil % (test code = 5.0 %        0.0-3.0      H            



             eosinophil %)                                        

 

             basophil % (test code = basophil 0.8 %        0.1-0.9              

     



             %)                                                  

 

             neutrophil # (test code = 8.0 K/mm3                              



             neutrophil #)                                        

 

             lymphocyte # (test code = 1.7 K/mm3                              



             lymphocyte #)                                        

 

             monocyte # (test code = monocyte 0.6 K/mm3                         

     



             #)                                                  

 

             eosinophil # (test code = 0.55 K/mm3                             



             eosinophil #)                                        

 

             basophil # (test code = basophil 0.1 K/mm3                         

     



             #)                                                  

 

             RBC morphology required (test code normal       normal             

       



             = RBC morphology required)                                        

 

             platelet morphology required (test normal       normal             

       



             code = platelet morphology                                        



             required)                                           

 

             performing lab: (test code =                                       

 



             performing lab:)                                        



[facility] [id number] 08:45:00





             Test Item    Value        Reference Range Interpretation Comments

 

             covid 19 asymptomatic ih Ag (test negative     negative            

      



             code = covid 19 asymptomatic ih Ag)                                

        

 

             performing lab: (test code =                                       

 



             performing lab:)                                        



[facility] [id number] 00:00:00





             Test Item    Value        Reference Range Interpretation Comments

 

             bacteria, urine (test code = none         none-few                 

 



             bacteria, urine)                                        

 

             blood, urine (test code = negative     negative                  



             blood, urine)                                        

 

             bilirubin, urine (test code = negative     negative                

  



             bilirubin, urine)                                        

 

             cast, granular, ur (test code none seen    0-1                     

  



             = cast, granular, ur)                                        

 

             cast, hyaline, urine (test 0-4          0-4                       



             code = cast, hyaline, urine)                                       

 

 

             cast, RBC, urine (test code = none seen    0-1                     

  



             cast, RBC, urine)                                        

 

             character (test code = clear        clear                     



             character)                                          

 

             color (test code = color) yellow       yellow, straw, arik        

      

 

             crystal amt. urine (test code none         none                    

  



             = crystal amt. urine)                                        

 

             crystals urine (test code = none         none                      



             crystals urine)                                        

 

             epithelial cells, ur (test none         none-few                  



             code = epithelial cells, ur)                                       

 

 

             glucose, urine (test code = negative     negative                  



             glucose, urine)                                        

 

             ketone, urine (test code = negative     negative                  



             ketone, urine)                                        

 

             leukocyte esterase (test code negative     negative                

  



             = leukocyte esterase)                                        

 

             nitrites urine (test code = negative     negative                  



             nitrites urine)                                        

 

             pH urine (test code = pH 6.5          5.0-8.0                   



             urine)                                              

 

             protein, urine (test code = negative     negative                  



             protein, urine)                                        

 

             RBC, urine (test code = RBC, none seen    none seen                

 



             urine)                                              

 

             specific gravity ur (test code 1.014        1.003-1.030            

   



             = specific gravity ur)                                        

 

             urobilinogen urine (test code 0.2 mg/dL    0.2-1.0                 

  



             = urobilinogen urine)                                        

 

             WBC, urine (test code = WBC, 0-4          0-4                      

 



             urine)                                              



[facility]Units/volume] in [id number] 
00:00:00





             Test Item    Value        Reference Range Interpretation Comments

 

             varicella zos.(IgG) >4000.0      See_Comment                [Automa

eamon message] The



             (test code = varicella                                        syste

m which generated



             zos.(IgG))                                          this result tra

nsmitted



                                                                 reference range

:



                                                                 immune>164.9. T

he



                                                                 reference range

 was not



                                                                 used to interpr

et this



                                                                 result as



                                                                 normal/abnormal

.



[facility] MedicalB[facility] U[facility] by [id number] 00:00:00





             Test Item    Value        Reference Range Interpretation Comments

 

             culture, urine (test code = see below    no growth                 



             culture, urine)                                        



[facility] in [id number] 00:00:00





             Test Item    Value        Reference Range Interpretation Comments

 

             Hb A (test code = 97.4 %       96.7-97.8                 



             Hb A)                                               

 

             Hb A2 (test code = 2.6 %        2.2-3.2                   



             Hb A2)                                              

 

             Hb C (test code = not detected not detected              



             Hb C)                                               

 

             Hb F (test code = not detected See_Comment                [Automate

d message]



             Hb F)                                               The system [employer]



                                                                 generated this 

result



                                                                 transmitted ref

erence



                                                                 range: <or=0.5.

 The



                                                                 reference range

 was not



                                                                 used to interpr

et this



                                                                 result as



                                                                 normal/abnormal

.

 

             Hb other (test not detected not detected              



             code = Hb other)                                        

 

             Hb S (test code = not detected not detected              



             Hb S)                                               



[facility] and [id number] 00:00:00





             Test Item    Value        Reference Range Interpretation Comments

 

             WBC (test code = 10.33 x10(3)/uL 4.00-10.10   H            



             WBC)                                                

 

             RBC (test code = 3.88 x10(6)/uL 3.58-5.19                 



             RBC)                                                

 

             HGB (test code = 12.0 g/dL    11.0-15.5                 



             HGB)                                                

 

             HCT (test code = 35.6 %       31.5-44.8                 



             HCT)                                                

 

             MCV (test code = 91.8 fL      78.0-98.0                 



             MCV)                                                

 

             MCH (test code = 30.9 pg      25.2-32.6                 



             MCH)                                                

 

             MCHC (test code = 33.7 g/dL    31.0-34.7                 



             MCHC)                                               

 

             RDW (test code = 13.1 %       12.0-15.5                 



             RDW)                                                

 

             platelet count (test 242 x10(3)/uL 140-425                   



             code = platelet                                        



             count)                                              

 

             lymphs (test code = 10.1 %       13.7-50.9    L            



             lymphs)                                             

 

             monos (test code = 3.8 %        3.0-11.9                  



             monos)                                              

 

             eos (test code = 0.2 %        0.0-5.0                   



             eos)                                                

 

             basos (test code = 0.6 %        0.0-1.0                   



             basos)                                              

 

             MPV (test code = 10.2 fL      8.6-12.1                  



             MPV)                                                

 

             polys (test code = 84.9 %       37.1-78.1    H            



             polys)                                              

 

             RPR (test code = non-reactive non-reactive              



             RPR)                                                

 

             immature     0.4 %        0.0-1.0                   



             granulocytes (test                                        



             code = immature                                        



             granulocytes)                                        

 

             ABO/Rh blood type B pos                                  



             (test code = ABO/Rh                                        



             blood type)                                         

 

             antibody screen negative     negative                  



             (test code =                                        



             antibody screen)                                        

 

             hep. B surf. Ag non-reactive non-reactive              



             (test code = hep. B                                        



             surf. Ag)                                           

 

             rubella,IgG (test 84.7 [IU]/mL See_Comment                [Automate

d



             code = rubella,IgG)                                        message]

 The



                                                                 system which



                                                                 generated this



                                                                 result transmit

eamon



                                                                 reference range

:



                                                                 immune >9.9. Th

e



                                                                 reference range



                                                                 was not used to



                                                                 interpret this



                                                                 result as



                                                                 normal/abnormal

.



[facility] MedicalBacterial vaginosis and vaginitis DNA panel - Vaginal fluid by 
Probe with signal [id number] 00:00:00





             Test Item    Value        Reference Range Interpretation Comments

 

             bacterial vaginosis (test code = bv neg       negative             

     



             bacterial vaginosis)                                        



[facility] MedicalUrinalysis macro (dipstick) panel - [id number] 09:13:00





             Test Item    Value        Reference Range Interpretation Comments

 

             Leukocytes (test code = Leukocytes) Negative                       

        

 

             Nitrite (test code = Nitrite) negative                             

  

 

             Protein (test code = Protein) Negative                             

  

 

             Glucose (test code = Glucose) Negative                             

  



[facility] MedicalUrinalysis macro (dipstick) panel - [id number] 09:13:00





             Test Item    Value        Reference Range Interpretation Comments

 

             Leukocytes (test code = Leukocytes) Negative                       

        

 

             Nitrite (test code = Nitrite) negative                             

  

 

             Protein (test code = Protein) Negative                             

  

 

             Glucose (test code = Glucose) Negative                             

  



[facility] ***2022 00:00:00





             Test Item    Value        Reference Range Interpretation Comments

 

             aptima combo 2 thinprep (CT) (test CT neg       negative           

       



             code = aptima combo 2 thinprep (CT))                               

         

 

             aptima combo 2 thinprep (GC) (test GC neg       negative           

       



             code = aptima combo 2 thinprep (GC))                               

         



[facility] Medicaltr[facility] vaginal[facility] ([facility]) ***2022 00:00:00





             Test Item    Value        Reference Range Interpretation Comments

 

             trichomonas vaginalis thinprep trich neg    negative               

   



             (test code = trichomonas vaginalis                                 

       



             thinprep)                                           



[facility] Medicalapt[facility]GC) ([facility]) ***2022 00:00:00





             Test Item    Value        Reference Range Interpretation Comments

 

             aptima combo 2 thinprep (CT) (test CT neg       negative           

       



             code = aptima combo 2 thinprep (CT))                               

         

 

             aptima combo 2 thinprep (GC) (test GC neg       negative           

       



             code = aptima combo 2 thinprep (GC))                               

         



[facility] Medicaltrich[facility] vaginalis [facility] ([facility]) ***2022 00:00:00





             Test Item    Value        Reference Range Interpretation Comments

 

             trichomonas vaginalis thinprep trich neg    negative               

   



             (test code = trichomonas vaginalis                                 

       



             thinprep)                                           



[facility] ***2022 00:00:00





             Test Item    Value        Reference Range Interpretation Comments

 

             candida species (test code = C. spp neg   negative                 

 



             candida species)                                        

 

             candida glabrata (test code = C. gla neg   negative                

  



             candida glabrata)                                        



[facility] MedicalHIV 1+2 Ab+HIV1 p24 Ag [Presence] in Serum or Plasma by 
[id number] 00:00:00





             Test Item    Value        Reference Range Interpretation Comments

 

             ethnicity: (test code = not provided                           



             ethnicity:)                                         

 

             race: (test code = race:) unknown                                

 

             HIV Ag/Ab (test code = HIV non-reactive non-reactive              



             Ag/Ab)                                              



[facility] MedicalHepatitis C virus Ab [Units/volume] in Serum by Immunoassay
2022 00:00:00





             Test Item    Value        Reference Range Interpretation Comments

 

             ethnicity: (test code = not provided                           



             ethnicity:)                                         

 

             race: (test code = race:) unknown                                

 

             hep C Ab. (S/co ratio) (test 0.10 S/co    <0.80                    

 



             code = hep C Ab. (S/co ratio))                                     

   

 

             hep. C Ab. (test code = hep. C non-reactive non-reactive           

   



             Ab.)                                                



[facility] MedicalGlucose [Mass/volume] in Serum or [id number] 00:00:00





             Test Item    Value        Reference Range Interpretation Comments

 

             glucose (test code = glucose) 93 mg/dL     70-99                   

  



[facility] MedicalCreatinine [Mass/volume] in Serum or [id number] 00:00:00





             Test Item    Value        Reference Range Interpretation Comments

 

             creatinine (test code = creatinine) 0.5 mg/dL    0.5-0.9           

        



[facility] MedicalGlucose [Mass/volume] in Serum or [id number] 00:00:00





             Test Item    Value        Reference Range Interpretation Comments

 

             glucose (test code = glucose) 93 mg/dL     70-99                   

  



[facility] MedicalCreatinine [Mass/volume] in Serum or [id number] 00:00:00





             Test Item    Value        Reference Range Interpretation Comments

 

             creatinine (test code = creatinine) 0.5 mg/dL    0.5-0.9           

        



[facility] MedicalPOCT URINALYSIS W SPECIFIC [id number] 14:05:00





             Test Item    Value        Reference Range Interpretation Comments

 

             POCT U SP GRAV (test code = .            1.005-1.025               



             3255)                                               

 

             POCT PH U (test code = 3254) 8 mg/dl      5-8                      

 

 

             POCT U LEUK EST (test code = negative     Negative - Negative      

        



             3)                                               

 

             POCT U NIT (test code = 3262) negative     Negative - Negative     

         

 

             POCT U PROT (test code = 3259) trace        Negative - Negative    

          

 

             POCT U GLU (test code = 3256) normal       Negative - Negative     

         

 

             POCT U KETONE (test code = 3258) negative     Negative - Negative  

            

 

             POCT U UROBILI (test code = .            0.2-1                     



             3260)                                               

 

             POCT U BILI (test code = 3261) .            Negative - Negative    

          

 

             POCT U BLD (test code = 3257) negative     Negative - Negative     

         

 

             POCT U COLOR (test code = 3266) .                                  

    

 

             POCT U APPEAR (test code = 3266) .                                 

     



[facility]

## 2022-12-19 NOTE — ER
Nurse's Notes                                                                                     

 [facility]                                                                 

Name: Krystal Lozano                                                                             

Age: 32 yrs                                                                                       

Sex: Female                                                                                       

: 1990                                                                                   

MRN: [id number]                                                                                   

Arrival Date: 2022                                                                          

Time: 14:28                                                                                       

Account#: [id number]                                                                            

Bed 19                                                                                            

Private MD:                                                                                       

Diagnosis: Hydronephrosis with renal and ureteral calculous obstruction-5mm UVJ                   

                                                                                                  

Presentation:                                                                                     

                                                                                             

14:59 Chief complaint: Patient states: I have a history of kidney stones, I had the flu a     jh5 

      week ago, and I am pretty sure i have a kidney stone it hurts so bad. Coronavirus           

      screen: Vaccine status: Patient reports being unvaccinated. Client denies travel out of     

      the [place] in the last 14 days. Ebola Screen: Patient negative for fever greater than or      

      equal to 101.5 degrees Fahrenheit, and additional compatible Ebola Virus Disease            

      symptoms Patient denies exposure to infectious person. Patient denies travel to an          

      Ebola-affected area in the 21 days before illness onset. Initial Sepsis Screen: Does        

      the patient meet any 2 criteria? No. Patient's initial sepsis screen is negative. Does      

      the patient have a suspected source of infection? No. Patient's initial sepsis screen       

      is negative. Risk Assessment: Do you want to hurt yourself or someone else? Patient         

      reports no desire to harm self or others. Onset of symptoms was 2022.          

14:59 Method Of Arrival: Wheelchair                                                           [facility] 

14:59 Acuity: OMRA 3                                                                           5 

                                                                                                  

Triage Assessment:                                                                                

15:02 General: Appears distressed, uncomfortable, slender, Behavior is calm, cooperative,     jh5 

      appropriate for age. Pain: Complains of pain in back.                                       

                                                                                                  

OB/GYN:                                                                                           

15:02 [facility] 2022                                                                              [facility] 

                                                                                                  

Historical:                                                                                       

- Allergies:                                                                                      

15:44 No Known Allergies;                                                                     hb  

- PMHx:                                                                                           

15:02 Kidney stones;                                                                          [facility] 

                                                                                                  

- Immunization history:: Adult Immunizations up to date.                                          

- Social history:: Smoking status: Reported history of juuling and/or vaping.                     

                                                                                                  

                                                                                                  

Screenin:00 [facility] ED Fall Risk Assessment (Adult) History of falling in the last 3 months,       hb  

      including since admission No falls in past 3 months (0 pts). Abuse screen: Denies           

      threats or abuse. Denies injuries from another. Nutritional screening: No deficits          

      noted. Tuberculosis screening: No symptoms or risk factors identified. Fall Risk Total      

      Baer Fall Scale indicates No Risk (0-24 pts).                                              

                                                                                                  

Assessment:                                                                                       

15:45 General: Appears in no apparent distress. ill, Behavior is calm, cooperative. Neuro:    hb  

      Level of Consciousness is awake, alert, obeys commands, Oriented to person, place,          

      time, situation. Cardiovascular: Patient's skin is warm and dry. Respiratory:               

      Respiratory effort is even, unlabored, Respiratory pattern is regular, symmetrical. GI:     

      Reports nausea. : Reports right flank pain. EENT: No signs and/or symptoms were           

      reported regarding the EENT system. Derm: Skin is pink, warm \T\ dry. Musculoskeletal: No   

      signs and/or symptoms reported regarding the musculoskeletal system.                        

17:30 Reassessment: Patient appears in no apparent distress at this time. Patient and/or      hb  

      family updated on plan of care and expected duration. Pain level reassessed. Patient is     

      alert, oriented x 3, equal unlabored respirations, skin warm/dry/pink.                      

                                                                                                  

Vital Signs:                                                                                      

14:59  / 109; Pulse 75; Resp 16; Temp 98.6; Pulse Ox 100% ; Weight 63.5 kg; Height 5    [facility] 

      ft. 4 in. (162.56 cm); Pain 10/10;                                                          

18:30  / 84; Pulse 72; Resp 16; Pulse Ox 99% on R/A; Pain 3/10;                         hb  

14:59 Body Mass Index 24.03 (63.50 kg, 162.56 cm)                                             [facility] 

                                                                                                  

ED Course:                                                                                        

14:28 Patient arrived in ED.                                                                  as  

14:30 Yun Mireles FNP-C is [facility].                                                          snw 

14:30 Ted Kulkarni DO is Attending Physician.                                                snw 

15:02 Triage completed.                                                                       5 

15:02 Arm band placed on right wrist.                                                         5 

15:36 Rosalva Ortega, RN is Primary Nurse.                                                   hb  

16:00 Patient has correct armband on for positive identification.                             hb  

16:32 CT Stone Protocol In Process Unspecified.                                               EDMS

18:31 No provider procedures requiring assistance completed. IV discontinued, intact,         hb  

      bleeding controlled, No redness/swelling at site.                                           

                                                                                                  

Administered Medications:                                                                         

15:16 Drug: Ketorolac 30 mg Route: IVP; Site: left antecubital;                               [facility] 

16:10 Follow up: Response: No adverse reaction                                                hb  

15:16 Drug: NS 0.9% 1000 ml Route: IV; Rate: 1 bolus; Site: left antecubital;                 5 

16:30 Follow up: Response: No adverse reaction; IV Status: Completed infusion; IV Intake:     hb  

      1000ml                                                                                      

15:16 Drug: Phenergan (promethazine) 25 mg Route: IM; Site: left deltoid;                     jh5 

16:10 Follow up: Response: No adverse reaction                                                hb  

15:16 Drug: Flomax (tamsulosin) 0.4 mg Route: PO;                                             jh5 

16:30 Follow up: Response: No adverse reaction                                                hb  

15:53 Drug: NS 0.9% 1000 ml Route: IV; Rate: 1 bolus; Site: right antecubital;                hb  

16:55 Follow up: Response: No adverse reaction; IV Status: Completed infusion; IV Intake:     hb  

      1000ml                                                                                      

17:49 Drug: morphine 2 mg Route: IVP; Infused Over: 4 mins; Site: right antecubital;          hb  

18:10 Follow up: Response: No adverse reaction                                                hb  

                                                                                                  

                                                                                                  

Medication:                                                                                       

17:30 VIS not applicable for this client.                                                     hb  

                                                                                                  

Intake:                                                                                           

16:30 IV: 1000ml; Total: 1000ml.                                                              hb  

16:55 IV: 1000ml; Total: 2000ml.                                                              hb  

                                                                                                  

Outcome:                                                                                          

18:05 Discharge ordered by MD.                                                                snw 

18:31 Discharged to home ambulatory.                                                          hb  

18:31 Condition: stable                                                                           

18:31 Discharge instructions given to patient, Instructed on discharge instructions, follow       

      up and referral plans. Demonstrated understanding of instructions, follow-up care,          

      medications, Prescriptions given X 2.                                                       

18:31 Patient left the ED.                                                                    hb  

                                                                                                  

Signatures:                                                                                       

Dispatcher [employer]                           EDMS                                                 

Yun Mireles FNP-C FNP-Leigh Reaves Heather RN                     RN                                                      

Velma Torres RN                       RN   jh5                                                  

                                                                                                  

Corrections: (The following items were deleted from the chart)                                    

16:00 15:45 : Reports flank pain hb                                                         hb  

                                                                                                  

**************************************************************************************************

## 2022-12-19 NOTE — RAD REPORT
EXAM DESCRIPTION:  CT - Stone Protocol - 12/19/2022 4:30 pm

 

CLINICAL HISTORY:  Abdominal pain. Back pain

 

COMPARISON:  2021

 

TECHNIQUE:  Computed axial tomography of the abdomen pelvis was obtained without oral or IV contrast.
 Lack of IV and oral contrast limits evaluation of solid organs, appendix, bowel, and vessels. Lujan
l reformatted images were obtained and reviewed.

 

All CT scans are performed using dose optimization technique as appropriate and may include automated
 exposure control or mA/KV adjustment according to patient size.

 

FINDINGS:  Moderate to marked right hydronephrosis. Marked dilatation right ureter. 5 millimeter calc
ulus right UVJ.

 

Small left renal calculi. No hydronephrosis

 

The liver, spleen, pancreas and adrenals appear grossly normal

 

There is no evidence of diverticulitis.

 

Nodules within the subcutaneous fat of the buttocks as well as ischial rectal fossa without significa
nt change

 

IMPRESSION:  5 millimeter calculus right UVJ resulting in moderate to marked right hydronephrosis

## 2022-12-19 NOTE — EDPHYS
Physician Documentation                                                                           

 [facility]                                                                 

Name: Krystal Lozano                                                                             

Age: 32 yrs                                                                                       

Sex: Female                                                                                       

: 1990                                                                                   

MRN: [id number]                                                                                   

Arrival Date: 2022                                                                          

Time: 14:28                                                                                       

Account#: [id number]                                                                            

Bed 19                                                                                            

Private MD:                                                                                       

ED Physician Ted Kulkarni                                                                         

HPI:                                                                                              

                                                                                             

15:05 This 32 yrs old  Female presents to ER via Wheelchair with complaints of        snw 

      Possible Kidney Stone.                                                                      

15:05 The patient complains of pain in the right mid back and right low back. The pain        snw 

      radiates to the right flank. Onset: The symptoms/episode began/occurred suddenly, last      

      night, and became worse this morning. Modifying factors: The symptoms are alleviated by     

      nothing. Associated signs and symptoms: Pertinent positives: nausea, vomiting. Severity     

      of pain: At its worst the pain was moderate. The patient has experienced similar            

      episodes in the past, several times, with the last episode occurring last year. The         

      patient has not recently seen a physician. had a baby last month, + breastfeeding.          

                                                                                                  

OB/GYN:                                                                                           

15:02 LMP 2022                                                                              [facility] 

                                                                                                  

Historical:                                                                                       

- Allergies:                                                                                      

15:44 No Known Allergies;                                                                     hb  

- PMHx:                                                                                           

15:02 Kidney stones;                                                                          [facility] 

                                                                                                  

- Immunization history:: Adult Immunizations up to date.                                          

- Social history:: Smoking status: Reported history of juuling and/or vaping.                     

                                                                                                  

                                                                                                  

ROS:                                                                                              

15:03 Constitutional: Negative for fever, chills, and weight loss, Eyes: Negative for injury, snw 

      pain, redness, and discharge, ENT: Negative for injury, pain, and discharge, Neck:          

      Negative for injury, pain, and swelling, Cardiovascular: Negative for chest pain,           

      palpitations, and edema, Respiratory: Negative for shortness of breath, cough,              

      wheezing, and pleuritic chest pain, Abdomen/GI: Negative for abdominal pain, nausea,        

      vomiting, diarrhea, and constipation.                                                       

15:03 Abdomen/GI: Negative for abdominal pain, diarrhea, and constipation, + nausea and           

      vomiting prior to arrival MS/Extremity: Negative for injury and deformity, Skin:            

      Negative for injury, rash, and discoloration, Neuro: Negative for headache, weakness,       

      numbness, tingling, and seizure, Psych: Negative for depression, anxiety, suicide           

      ideation, homicidal ideation, and hallucinations.                                           

15:03 Back: Positive for pain at rest, flank pain.                                                

15:03 : Positive for small amounts.                                                             

                                                                                                  

Exam:                                                                                             

15:03 Constitutional:  This is a well developed, well nourished patient who is awake, alert,  snw 

      and in no acute distress. Head/Face:  Normocephalic, atraumatic. Eyes:  Pupils equal        

      round and reactive to light, extra-ocular motions intact.  Lids and lashes normal.          

      Conjunctiva and sclera are non-icteric and not injected.  Cornea within normal limits.      

      Periorbital areas with no swelling, redness, or edema. ENT:  Nares patent. No nasal         

      discharge, no septal abnormalities noted.  Tympanic membranes are normal and external       

      auditory canals are clear.  Oropharynx with no redness, swelling, or masses, exudates,      

      or evidence of obstruction, uvula midline.  Mucous membranes moist. Neck:  Trachea          

      midline, no thyromegaly or masses palpated, and no cervical lymphadenopathy.  Supple,       

      full range of motion without nuchal rigidity, or vertebral point tenderness.  No            

      Meningismus. Chest/axilla:  Normal chest wall appearance and motion.  Nontender with no     

      deformity.  No lesions are appreciated. Cardiovascular:  Regular rate and rhythm with a     

      normal S1 and S2.  No gallops, murmurs, or rubs.  Normal PMI, no JVD.  No pulse             

      deficits. Respiratory:  Lungs have equal breath sounds bilaterally, clear to                

      auscultation and percussion.  No rales, rhonchi or wheezes noted.  No increased work of     

      breathing, no retractions or nasal flaring. Abdomen/GI:  Soft, non-tender, with normal      

      bowel sounds.  No distension or tympany.  No guarding or rebound.  No evidence of           

      tenderness throughout. Skin:  Warm, dry with normal turgor.  Normal color with no           

      rashes, no lesions, and no evidence of cellulitis. MS/ Extremity:  Pulses equal, no         

      cyanosis.  Neurovascular intact.  Full, normal range of motion. Neuro:  Awake and           

      alert, GCS 15, oriented to person, place, time, and situation.  Cranial nerves II-XII       

      grossly intact.  Motor strength 5/5 in all extremities.  Sensory grossly intact.            

      Cerebellar exam normal.  Normal gait. Psych:  Awake, alert, with orientation to person,     

      place and time.  Behavior, mood, and affect are within normal limits.                       

15:03 Back: pain, that is moderate, that is severe, of the  right mid back, CVA tenderness,       

      that is mild, is noted on the right.                                                        

                                                                                                  

Vital Signs:                                                                                      

14:59  / 109; Pulse 75; Resp 16; Temp 98.6; Pulse Ox 100% ; Weight 63.5 kg; Height 5    jh5 

      ft. 4 in. (162.56 cm); Pain 10/10;                                                          

18:30  / 84; Pulse 72; Resp 16; Pulse Ox 99% on R/A; Pain 3/10;                         hb  

14:59 Body Mass Index 24.03 (63.50 kg, 162.56 cm)                                             jh5 

                                                                                                  

MDM:                                                                                              

14:57 Patient medically screened.                                                             snw 

18:06 Data reviewed: vital signs, nurses notes. Data interpreted: Pulse oximetry: on room air snw 

      is 100 %. Interpretation: normal. Counseling: I had a detailed discussion with the          

      patient and/or guardian regarding: the historical points, exam findings, and any            

      diagnostic results supporting the discharge/admit diagnosis, the presence of at least       

      one elevated blood pressure reading (>120/80) during this emergency department visit,       

      lab results, radiology results, the need for outpatient follow up, to return to the         

      emergency department if symptoms worsen or persist or if there are any questions or         

      concerns that arise at home. Response to treatment: the patient's symptoms have             

      markedly improved after treatment. Special discussion: Based on the patient's Hx, exam,     

      and Dx evaluation, there is no indication for emergent surgery or inpatient Tx. It is       

      understood by the patient/guardian that if the Sx's persist or worsen they need to          

      return immediately for re-evaluation. Based on the history and exam findings, there is      

      no indication for further emergent testing or inpatient evaluation. I discussed with        

      the patient/guardian the need to see the primary care provider for further evaluation       

      of the symptoms. I discussed with the patient/guardian the need to see the urologist        

      for further evaluation of the symptoms.                                                     

                                                                                                  

                                                                                             

15:44 Order name: Urine Dipstick-Ancillary; Complete Time: 15:45                              EDMS

                                                                                             

15:45 Order name: Urine Pregnancy--Ancillary (enter results); Complete Time: 16:00            em1 

                                                                                             

16:01 Order name: CT Stone Protocol; Complete Time: 16:43                                     snw 

                                                                                             

14:57 Order name: Urine Dipstick-Ancillary (obtain specimen); Complete Time: 15:44            snw 

                                                                                             

14:57 Order name: Urine Pregnancy Test (obtain specimen); Complete Time: 15:44                snw 

                                                                                             

18:05 Order name: Recheck VS                                                                  snw 

                                                                                                  

Administered Medications:                                                                         

15:16 Drug: Ketorolac 30 mg Route: IVP; Site: left antecubital;                               jh5 

16:10 Follow up: Response: No adverse reaction                                                hb  

15:16 Drug: NS 0.9% 1000 ml Route: IV; Rate: 1 bolus; Site: left antecubital;                 jh5 

16:30 Follow up: Response: No adverse reaction; IV Status: Completed infusion; IV Intake:     hb  

      1000ml                                                                                      

15:16 Drug: Phenergan (promethazine) 25 mg Route: IM; Site: left deltoid;                     jh5 

16:10 Follow up: Response: No adverse reaction                                                hb  

15:16 Drug: Flomax (tamsulosin) 0.4 mg Route: PO;                                             jh5 

16:30 Follow up: Response: No adverse reaction                                                hb  

15:53 Drug: NS 0.9% 1000 ml Route: IV; Rate: 1 bolus; Site: right antecubital;                hb  

16:55 Follow up: Response: No adverse reaction; IV Status: Completed infusion; IV Intake:     hb  

      1000ml                                                                                      

17:49 Drug: morphine 2 mg Route: IVP; Infused Over: 4 mins; Site: right antecubital;          hb  

18:10 Follow up: Response: No adverse reaction                                                hb  

                                                                                                  

                                                                                                  

Disposition:                                                                                      

22:04 Co-signature as Attending Physician, Ted DNAIELS was immediately available on-site ms3 

      in the Emergency Department for consultation in the care of the patient. .                  

                                                                                                  

Disposition Summary:                                                                              

22 18:05                                                                                    

Discharge Ordered                                                                                 

      Location: Home                                                                          snw 

      Condition: Stable                                                                       snw 

      Diagnosis                                                                                   

        - Hydronephrosis with renal and ureteral calculous obstruction - 5mm UVJ              snw 

      Followup:                                                                               snw 

        - With: Emergency Department                                                               

        - When: As needed                                                                          

        - Reason: Worsening of condition                                                           

      Followup:                                                                               snw 

        - With: Private Physician                                                                  

        - When: 2 - 3 days                                                                         

        - Reason: Recheck today's complaints, Continuance of care, Re-evaluation by your           

      physician                                                                                   

      Discharge Instructions:                                                                     

        - Discharge Summary Sheet                                                             snw 

        - Kidney Stones                                                                       snw 

        - Hydronephrosis                                                                      snw 

        - Dietary Guidelines to Help Prevent Kidney Stones                                    snw 

        - Rehydration, Adult                                                                  snw 

      Forms:                                                                                      

        - Medication Reconciliation Form                                                      snw 

        - Thank You Letter                                                                    snw 

        - Antibiotic Education                                                                snw 

        - Prescription Opioid Use                                                             snw 

        - Family Work Release                                                                 hb  

      Prescriptions:                                                                              

        - Mobic 7.5 mg Oral Tablet                                                                 

            - take 1 tablet by ORAL route once daily take with food; 20 tablet; Refills: 0,   snw 

      Product Selection Permitted                                                                 

        - promethazine 25 mg Oral Tablet                                                           

            - take 1 tablet by ORAL route every 6 hours As needed; 20 tablet; Refills: 0,     snw 

      Product Selection Permitted                                                                 

Signatures:                                                                                       

Dispatcher [employer]                           EDYun Sofia, FNP-C                   FNP-Csnw                                                  

Rosalva Ortega, RN                     RN                                                      

Ted Kulkarni DO DO   ms3                                                  

Velma Torres, RN                       RN   jh5                                                  

                                                                                                  

**************************************************************************************************